# Patient Record
Sex: FEMALE | Race: WHITE | NOT HISPANIC OR LATINO | Employment: UNEMPLOYED | URBAN - METROPOLITAN AREA
[De-identification: names, ages, dates, MRNs, and addresses within clinical notes are randomized per-mention and may not be internally consistent; named-entity substitution may affect disease eponyms.]

---

## 2017-04-04 ENCOUNTER — TRANSFERRED RECORDS (OUTPATIENT)
Dept: HEALTH INFORMATION MANAGEMENT | Facility: CLINIC | Age: 32
End: 2017-04-04

## 2019-10-03 ENCOUNTER — OFFICE VISIT (OUTPATIENT)
Dept: OBGYN | Facility: CLINIC | Age: 34
End: 2019-10-03
Payer: COMMERCIAL

## 2019-10-03 VITALS
BODY MASS INDEX: 39.37 KG/M2 | HEIGHT: 63 IN | WEIGHT: 222.2 LBS | DIASTOLIC BLOOD PRESSURE: 68 MMHG | SYSTOLIC BLOOD PRESSURE: 106 MMHG

## 2019-10-03 DIAGNOSIS — F15.11 HISTORY OF METHAMPHETAMINE ABUSE (H): ICD-10-CM

## 2019-10-03 DIAGNOSIS — Z90.710 POST HYSTERECTOMY MENOPAUSE: Primary | ICD-10-CM

## 2019-10-03 DIAGNOSIS — E03.8 OTHER SPECIFIED HYPOTHYROIDISM: ICD-10-CM

## 2019-10-03 DIAGNOSIS — F15.91 HISTORY OF METHAMPHETAMINE USE: ICD-10-CM

## 2019-10-03 DIAGNOSIS — E89.40 POST HYSTERECTOMY MENOPAUSE: Primary | ICD-10-CM

## 2019-10-03 PROCEDURE — 99000 SPECIMEN HANDLING OFFICE-LAB: CPT | Performed by: OBSTETRICS & GYNECOLOGY

## 2019-10-03 PROCEDURE — 80307 DRUG TEST PRSMV CHEM ANLYZR: CPT | Mod: 90 | Performed by: OBSTETRICS & GYNECOLOGY

## 2019-10-03 PROCEDURE — 84443 ASSAY THYROID STIM HORMONE: CPT | Performed by: OBSTETRICS & GYNECOLOGY

## 2019-10-03 PROCEDURE — 84439 ASSAY OF FREE THYROXINE: CPT | Performed by: OBSTETRICS & GYNECOLOGY

## 2019-10-03 PROCEDURE — 36415 COLL VENOUS BLD VENIPUNCTURE: CPT | Performed by: OBSTETRICS & GYNECOLOGY

## 2019-10-03 PROCEDURE — 99202 OFFICE O/P NEW SF 15 MIN: CPT | Performed by: OBSTETRICS & GYNECOLOGY

## 2019-10-03 RX ORDER — QUETIAPINE FUMARATE 200 MG/1
200 TABLET, FILM COATED ORAL
COMMUNITY
Start: 2019-09-19

## 2019-10-03 RX ORDER — CLONIDINE HYDROCHLORIDE 0.1 MG/1
0.1 TABLET ORAL
COMMUNITY
Start: 2019-09-19 | End: 2019-11-22

## 2019-10-03 RX ORDER — OXCARBAZEPINE 150 MG/1
150 TABLET, FILM COATED ORAL
COMMUNITY
Start: 2019-09-19 | End: 2019-11-07

## 2019-10-03 RX ORDER — NICOTINE 21 MG/24HR
1 PATCH, TRANSDERMAL 24 HOURS TRANSDERMAL EVERY 24 HOURS
COMMUNITY
End: 2019-10-24

## 2019-10-03 RX ORDER — POLYETHYLENE GLYCOL 3350 17 G/17G
17 POWDER, FOR SOLUTION ORAL
COMMUNITY
Start: 2019-03-14 | End: 2019-10-03

## 2019-10-03 RX ORDER — ESTRADIOL 0.1 MG/D
1 PATCH TRANSDERMAL
COMMUNITY
Start: 2019-06-21 | End: 2019-10-03

## 2019-10-03 RX ORDER — LEVOTHYROXINE SODIUM 25 UG/1
25 TABLET ORAL
COMMUNITY
Start: 2019-09-19 | End: 2019-10-24

## 2019-10-03 RX ORDER — IBUPROFEN 800 MG/1
800 TABLET, FILM COATED ORAL
COMMUNITY
Start: 2018-11-02 | End: 2019-10-24

## 2019-10-03 RX ORDER — PRAZOSIN HYDROCHLORIDE 2 MG/1
4 CAPSULE ORAL
COMMUNITY
Start: 2019-09-19 | End: 2019-11-22

## 2019-10-03 RX ORDER — AMLODIPINE BESYLATE 5 MG/1
5 TABLET ORAL
COMMUNITY
Start: 2019-09-19 | End: 2019-11-22

## 2019-10-03 RX ORDER — ESTRADIOL 2 MG/1
2 TABLET ORAL DAILY
Qty: 30 TABLET | Refills: 1 | Status: SHIPPED | OUTPATIENT
Start: 2019-10-03 | End: 2020-02-11

## 2019-10-03 RX ORDER — BUSPIRONE HYDROCHLORIDE 15 MG/1
15 TABLET ORAL 3 TIMES DAILY
COMMUNITY
End: 2020-02-11

## 2019-10-03 ASSESSMENT — ANXIETY QUESTIONNAIRES
IF YOU CHECKED OFF ANY PROBLEMS ON THIS QUESTIONNAIRE, HOW DIFFICULT HAVE THESE PROBLEMS MADE IT FOR YOU TO DO YOUR WORK, TAKE CARE OF THINGS AT HOME, OR GET ALONG WITH OTHER PEOPLE: NOT DIFFICULT AT ALL
1. FEELING NERVOUS, ANXIOUS, OR ON EDGE: NEARLY EVERY DAY
5. BEING SO RESTLESS THAT IT IS HARD TO SIT STILL: MORE THAN HALF THE DAYS
3. WORRYING TOO MUCH ABOUT DIFFERENT THINGS: NEARLY EVERY DAY
GAD7 TOTAL SCORE: 20
6. BECOMING EASILY ANNOYED OR IRRITABLE: NEARLY EVERY DAY
7. FEELING AFRAID AS IF SOMETHING AWFUL MIGHT HAPPEN: NEARLY EVERY DAY
2. NOT BEING ABLE TO STOP OR CONTROL WORRYING: NEARLY EVERY DAY

## 2019-10-03 ASSESSMENT — PATIENT HEALTH QUESTIONNAIRE - PHQ9
5. POOR APPETITE OR OVEREATING: NEARLY EVERY DAY
SUM OF ALL RESPONSES TO PHQ QUESTIONS 1-9: 13

## 2019-10-03 ASSESSMENT — MIFFLIN-ST. JEOR: SCORE: 1682.02

## 2019-10-03 NOTE — PATIENT INSTRUCTIONS
Please have return in 2-3 weeks to evaluate the medication and to also do a blood pressure check.    Will check thyroid today prior to leaving.  Will also do a drug screen

## 2019-10-03 NOTE — PROGRESS NOTES
SUBJECTIVE:                                                   Autumn Campos is a 34 year old female who presents to clinic today for the following health issue(s):  Patient presents with:  Consult: Patient states that she is here for some type of hormone replacement medication.       HPI:    Patient is presenting for hormonal consult.  Patient had a total abdominal hysterectomy with bilateral salpingo-oophorectomy in May 2016 at [facility].  She was diagnosed with chronic PID she had a tubo-ovarian abscess and unfortunately they were unable to save the ovaries.  Patient's recovery was very complicated as she had a wound VAC and several wound debridements.  Patient has been followed closely by primary care physician for hypertension and is currently on blood pressure medication.     Patient is currently in treatment for methamphetamine use.  Patient states that she has 13 months of treatment.      Discussed hormonal therapy for her.  Discussed the different options.  Discussed the risk of blood clot high blood pressure.  Discussed we would want to follow up with her closely with her history of high blood pressure.  Discussed given her age we are comfortable moving forward with hormonal replacement.  Also counseled her regarding the fact that she does not have a uterus we will proceed with only estrogen therapy.  Her pathology from her surgery was in her care everywhere and that was reviewed she does not have a uterus, tubes, ovaries or cervix.    Of note patient also struggling to control her thyroid.  She has hypothyroidism last TSH was 11.6.  We will check her TSH with reflex T4 while she is in office today.        No LMP recorded. Patient has had a hysterectomy..     Patient is sexually active, No obstetric history on file..  Using hysterectomy for contraception.    reports that she has quit smoking. She smoked 0.00 packs per day. She has never used smokeless tobacco.    STD testing offered?   Declined    Health maintenance updated:  yes    Today's PHQ-2 Score:   PHQ-2 ( 1999 [employer]) 10/3/2019   Q1: Little interest or pleasure in doing things 2   Q2: Feeling down, depressed or hopeless 2   PHQ-2 Score 4     Today's PHQ-9 Score:   PHQ-9 SCORE 10/3/2019   PHQ-9 Total Score 13     Today's CHELA-7 Score:   CHELA-7 SCORE 10/3/2019   Total Score 20       Problem list and histories reviewed & adjusted, as indicated.  Additional history: as documented.    Patient Active Problem List   Diagnosis     History of methamphetamine use     Past Surgical History:   Procedure Laterality Date     Total abodominal hysterectomy Bilateral     removed uterus, cervix, tubes and ovaries      Social History     Tobacco Use     Smoking status: Former Smoker     Packs/day: 0.00     Smokeless tobacco: Never Used     Tobacco comment: Quit about a week ago.   Substance Use Topics     Alcohol use: Yes           Current Outpatient Medications   Medication Sig     amLODIPine (NORVASC) 5 MG tablet Take 5 mg by mouth     busPIRone (BUSPAR) 15 MG tablet Take 15 mg by mouth 3 times daily     cloNIDine (CATAPRES) 0.1 MG tablet Take 0.1 mg by mouth     estradiol (ESTRACE) 2 MG tablet Take 1 tablet (2 mg) by mouth daily     ibuprofen (ADVIL/MOTRIN) 800 MG tablet Take 800 mg by mouth     levothyroxine (SYNTHROID/LEVOTHROID) 25 MCG tablet Take 25 mcg by mouth     nicotine (NICODERM CQ) 14 MG/24HR 24 hr patch Place 1 patch onto the skin every 24 hours     OXcarbazepine (TRILEPTAL) 150 MG tablet Take 150 mg by mouth     prazosin (MINIPRESS) 2 MG capsule Take 4 mg by mouth     Prenatal Vit-Fe Fumarate-FA (PRENATAL PO)      QUEtiapine (SEROQUEL) 200 MG tablet Take 200 mg by mouth     ranitidine (ZANTAC) 150 MG tablet Take 150 mg by mouth     sertraline (ZOLOFT) 50 MG tablet Take 50 mg by mouth daily     No current facility-administered medications for this visit.      No Known Allergies    ROS:  12 point review of systems negative other than symptoms  "noted below.  Constitutional: Fatigue and Weight Gain  Genitourinary: Hot Flashes and Night Sweats    OBJECTIVE:     /68   Ht 1.6 m (5' 3\")   Wt 100.8 kg (222 lb 3.2 oz)   Breastfeeding? No   BMI 39.36 kg/m    Body mass index is 39.36 kg/m .    Exam:  Constitutional:  Appearance: Well nourished, well developed alert, in no acute distress  Psychiatric:  Mentation appears normal and affect normal/bright.   Chest: CTAB, RRR    In-Clinic Test Results:  No results found for this or any previous visit (from the past 24 hour(s)).    ASSESSMENT/PLAN:                                                        ICD-10-CM    1. Post hysterectomy menopause E89.40 estradiol (ESTRACE) 2 MG tablet    Z90.710    2. Other specified hypothyroidism E03.8 TSH with free T4 reflex     ENDOCRINOLOGY ADULT REFERRAL   3. History of methamphetamine abuse (H) F15.11  Drug Screen Comp Med Report WB   4. History of methamphetamine use Z87.898        Patient Instructions   Please have return in 2-3 weeks to evaluate the medication and to also do a blood pressure check.    Will check thyroid today prior to leaving.  Will also do a drug screen        1. Hormone replacement therapy-we will trial her on estradiol at this time.  Discussed risk benefits alternatives.  Also discussed side effect profile.  2. Thyroid asesment today, consider seeing endocrine in follow up  3. Return in 2-3 weeks, likely repeat thyroid labs at that time    Ariel Nath MD  [facility]  "

## 2019-10-04 ENCOUNTER — TELEPHONE (OUTPATIENT)
Dept: OBGYN | Facility: CLINIC | Age: 34
End: 2019-10-04

## 2019-10-04 DIAGNOSIS — E03.8 OTHER SPECIFIED HYPOTHYROIDISM: Primary | ICD-10-CM

## 2019-10-04 LAB
T4 FREE SERPL-MCNC: 0.85 NG/DL (ref 0.76–1.46)
TSH SERPL DL<=0.005 MIU/L-ACNC: 5.82 MU/L (ref 0.4–4)

## 2019-10-04 RX ORDER — LEVOTHYROXINE SODIUM 50 UG/1
50 TABLET ORAL DAILY
Qty: 90 TABLET | Refills: 0 | Status: CANCELLED | OUTPATIENT
Start: 2019-10-04

## 2019-10-04 ASSESSMENT — ANXIETY QUESTIONNAIRES: GAD7 TOTAL SCORE: 20

## 2019-10-15 LAB
AMPHETAMINES SPEC-MCNC: NEGATIVE NG/ML
APAP BLD-MCNC: NEGATIVE UG/ML
BARBITURATES SPEC-MCNC: NEGATIVE UG/ML
BENZODIAZ SPEC-MCNC: NEGATIVE NG/ML
BUPRENORPHINE SERPLBLD-MCNC: NEGATIVE NG/ML
CARBOXYTHC BLD-MCNC: NEGATIVE NG/ML
CARISOPRODOL IA: NEGATIVE UG/ML
COCAINE METABOLITE IA: NEGATIVE NG/ML
DECLARED MEDICATIONS: NORMAL
ETHANOL BLD-MCNC: NEGATIVE GM/DL
FENTANYL IA: NEGATIVE NG/ML
GABAPENTIN IA: NEGATIVE UG/ML
MEPERIDINE SERPLBLD-MCNC: NEGATIVE NG/ML
METHADONE BLD-MCNC: NEGATIVE NG/ML
OPIATES SPEC-MCNC: NEGATIVE NG/ML
OTHER DRUGS DETECTED: POSITIVE
OXYCODONE SERPLBLD SCN-MCNC: NEGATIVE NG/ML
PCP SPEC-MCNC: NEGATIVE NG/ML
PROPOXYPH SPEC-MCNC: NEGATIVE NG/ML
TRAMADOL BLD-MCNC: NEGATIVE NG/ML

## 2019-10-21 NOTE — PROGRESS NOTES
SUBJECTIVE:                                                   Autumn Campos is a 34 year old female who presents to clinic today for the following health issue(s):  Patient presents with:  Recheck Medication: f/u to estradiol-states things are going well. No new issues      HPI:  Patient overall doing well.  Patient states that on the estrogen her hot flashes are getting better.  No dizziness.  No sob, no nausea or emesis.    Treatment going ok.  She is changing counselors.  Patient states that she was stopped on her effexor and she is on sertraline at this time. She stopped the oxcarbazapine.            No LMP recorded. Patient has had a hysterectomy..     Patient is sexually active, .  Using none for contraception.    reports that she has quit smoking. She smoked 0.00 packs per day. She has never used smokeless tobacco.    STD testing offered?  Declined    Health maintenance updated:  yes    Problem list and histories reviewed & adjusted, as indicated.  Additional history: as documented.    Patient Active Problem List   Diagnosis     History of methamphetamine use     Past Surgical History:   Procedure Laterality Date     Total abodominal hysterectomy Bilateral     removed uterus, cervix, tubes and ovaries      Social History     Tobacco Use     Smoking status: Former Smoker     Packs/day: 0.00     Smokeless tobacco: Never Used     Tobacco comment: Quit about a week ago.   Substance Use Topics     Alcohol use: Yes      Problem (# of Occurrences) Relation (Name,Age of Onset)    No Known Problems (8) Mother, Father, Sister, Brother, Maternal Grandmother, Maternal Grandfather, Paternal Grandmother, Other            Current Outpatient Medications   Medication Sig     amLODIPine (NORVASC) 5 MG tablet Take 5 mg by mouth     busPIRone (BUSPAR) 15 MG tablet Take 15 mg by mouth 3 times daily     cloNIDine (CATAPRES) 0.1 MG tablet Take 0.1 mg by mouth     estradiol (ESTRACE) 2 MG tablet Take 1 tablet (2 mg) by  "mouth daily     levothyroxine (SYNTHROID/LEVOTHROID) 50 MCG tablet Take 50 mcg by mouth daily     OXcarbazepine (TRILEPTAL) 150 MG tablet Take 150 mg by mouth     prazosin (MINIPRESS) 2 MG capsule Take 4 mg by mouth     Prenatal Vit-Fe Fumarate-FA (PRENATAL PO)      QUEtiapine (SEROQUEL) 200 MG tablet Take 200 mg by mouth     ranitidine (ZANTAC) 150 MG tablet Take 150 mg by mouth     sertraline (ZOLOFT) 100 MG tablet Take 100 mg by mouth Takes 150 mg daily (1.5 tab)     No current facility-administered medications for this visit.      No Known Allergies    ROS:  12 point review of systems negative other than symptoms noted below.    OBJECTIVE:     /66   Pulse 70   Ht 1.6 m (5' 3\")   Wt 100.7 kg (222 lb)   BMI 39.33 kg/m    Body mass index is 39.33 kg/m .    Exam:  Constitutional:  Appearance: Well nourished, well developed alert, in no acute distress  Chest:  Respiratory Effort:  Breathing unlabored.   Cardiovascular: no edema  Neurologic:  Mental Status:  Oriented X3.    Psychiatric:  Mentation appears normal and affect normal/bright.     In-Clinic Test Results:  No results found for this or any previous visit (from the past 24 hour(s)).    ASSESSMENT/PLAN:                                                        ICD-10-CM    1. Other specified hypothyroidism E03.8 TSH with free T4 reflex   2. Surgical menopause E89.40        There are no Patient Instructions on file for this visit.    1. Menopause- will have her return in 2-3 weeks.  Had multiple medication changes.  Need to assess how she is doing  2. Thyroid- test today    20 minutes was spent face to face with the patient today discussing her history, diagnosis, and follow-up plan as noted above. Over 50% of the visit was spent in counseling and coordination of care.          Marielos George MD  [facility]  "

## 2019-10-24 ENCOUNTER — OFFICE VISIT (OUTPATIENT)
Dept: OBGYN | Facility: CLINIC | Age: 34
End: 2019-10-24
Payer: COMMERCIAL

## 2019-10-24 VITALS
BODY MASS INDEX: 39.34 KG/M2 | WEIGHT: 222 LBS | SYSTOLIC BLOOD PRESSURE: 114 MMHG | DIASTOLIC BLOOD PRESSURE: 66 MMHG | HEART RATE: 70 BPM | HEIGHT: 63 IN

## 2019-10-24 DIAGNOSIS — E03.8 OTHER SPECIFIED HYPOTHYROIDISM: Primary | ICD-10-CM

## 2019-10-24 DIAGNOSIS — E89.40 SURGICAL MENOPAUSE: ICD-10-CM

## 2019-10-24 PROCEDURE — 84443 ASSAY THYROID STIM HORMONE: CPT | Performed by: OBSTETRICS & GYNECOLOGY

## 2019-10-24 PROCEDURE — 84439 ASSAY OF FREE THYROXINE: CPT | Performed by: OBSTETRICS & GYNECOLOGY

## 2019-10-24 PROCEDURE — 99213 OFFICE O/P EST LOW 20 MIN: CPT | Performed by: OBSTETRICS & GYNECOLOGY

## 2019-10-24 PROCEDURE — 36415 COLL VENOUS BLD VENIPUNCTURE: CPT | Performed by: OBSTETRICS & GYNECOLOGY

## 2019-10-24 RX ORDER — SERTRALINE HYDROCHLORIDE 100 MG/1
100 TABLET, FILM COATED ORAL
COMMUNITY
End: 2019-11-22

## 2019-10-24 RX ORDER — LEVOTHYROXINE SODIUM 50 UG/1
50 TABLET ORAL DAILY
COMMUNITY
End: 2019-11-07

## 2019-10-24 ASSESSMENT — MIFFLIN-ST. JEOR: SCORE: 1681.12

## 2019-10-25 ENCOUNTER — TELEPHONE (OUTPATIENT)
Dept: OBGYN | Facility: CLINIC | Age: 34
End: 2019-10-25

## 2019-10-25 DIAGNOSIS — E03.8 OTHER SPECIFIED HYPOTHYROIDISM: Primary | ICD-10-CM

## 2019-10-25 LAB
T4 FREE SERPL-MCNC: 0.7 NG/DL (ref 0.76–1.46)
TSH SERPL DL<=0.005 MIU/L-ACNC: 7.72 MU/L (ref 0.4–4)

## 2019-10-25 RX ORDER — LEVOTHYROXINE SODIUM 88 UG/1
88 TABLET ORAL DAILY
Qty: 30 TABLET | Refills: 0 | Status: SHIPPED | OUTPATIENT
Start: 2019-10-25 | End: 2019-11-07

## 2019-10-25 NOTE — TELEPHONE ENCOUNTER
Result note from Dr. George  Notes recorded by Marielos Barrera MD on 10/25/2019 at 8:13 AM CDT  Her TSH is worse actually.  Is she taking the synthroid?  Can we increase to 88 mcg and repeat in 3 weeks    Patient informed.  Report that she is currently taking Synthroid 50 mcg. Patient agrees to take Synthroid 88 mcg and repeat lab in 3 weeks.     Request rx for 88 mcg send to [facility].  Sent as requested. Future lab placed 10/4/19.      Hilda Maza RN

## 2019-11-04 NOTE — PROGRESS NOTES
SUBJECTIVE:                                                   Autumn Campos is a 34 year old female who presents to clinic today for the following health issue(s):  Patient presents with:  Recheck Medication: no concerns, states still doing well on medications      HPI: Medication follow up  Patient with less hot flashes.  She is noticing more weight gain.  Patient with no headaches.  No nausea.  Patient states overall her menopausal symptoms have improved.  She has noticed weight gain.    No LMP recorded. Patient has had a hysterectomy..     Patient is not sexually active, .  Using hysterectomy for contraception.    reports that she has quit smoking. She smoked 0.00 packs per day. She has never used smokeless tobacco.    STD testing offered?  Declined    Health maintenance updated:  yes    Today's PHQ-2 Score:   PHQ-2 (  [employer]) 10/3/2019   Q1: Little interest or pleasure in doing things 2   Q2: Feeling down, depressed or hopeless 2   PHQ-2 Score 4     Today's PHQ-9 Score:   PHQ-9 SCORE 10/3/2019   PHQ-9 Total Score 13     Today's CHELA-7 Score:   CHELA-7 SCORE 10/3/2019   Total Score 20       Problem list and histories reviewed & adjusted, as indicated.  Additional history: as documented.    Patient Active Problem List   Diagnosis     History of methamphetamine use     Morbid obesity (H)     Past Surgical History:   Procedure Laterality Date     Total abodominal hysterectomy Bilateral     removed uterus, cervix, tubes and ovaries      Social History     Tobacco Use     Smoking status: Former Smoker     Packs/day: 0.00     Smokeless tobacco: Never Used     Tobacco comment: Quit about a week ago.   Substance Use Topics     Alcohol use: Yes      Problem (# of Occurrences) Relation (Name,Age of Onset)    No Known Problems (8) Mother, Father, Sister, Brother, Maternal Grandmother, Maternal Grandfather, Paternal Grandmother, Other            Current Outpatient Medications   Medication Sig     amLODIPine  "(NORVASC) 5 MG tablet Take 5 mg by mouth     busPIRone (BUSPAR) 15 MG tablet Take 15 mg by mouth 3 times daily     cloNIDine (CATAPRES) 0.1 MG tablet Take 0.1 mg by mouth     estradiol (ESTRACE) 2 MG tablet Take 1 tablet (2 mg) by mouth daily     levothyroxine (SYNTHROID/LEVOTHROID) 88 MCG tablet Take 1 tablet (88 mcg) by mouth daily     Prenatal Vit-Fe Fumarate-FA (PRENATAL PO)      Prenatal Vit-Fe Fumarate-FA (PRENATAL VITAMIN) 27-0.8 MG TABS Take 1 tablet by mouth daily     QUEtiapine (SEROQUEL) 200 MG tablet Take 200 mg by mouth     ranitidine (ZANTAC) 150 MG tablet Take 150 mg by mouth     sertraline (ZOLOFT) 100 MG tablet Take 100 mg by mouth Takes 150 mg daily (1.5 tab)     prazosin (MINIPRESS) 2 MG capsule Take 4 mg by mouth     No current facility-administered medications for this visit.      No Known Allergies    ROS:  12 point review of systems negative other than symptoms noted below.    OBJECTIVE:     /70   Ht 1.6 m (5' 3\")   Wt 109.2 kg (240 lb 12.8 oz)   BMI 42.66 kg/m    Body mass index is 42.66 kg/m .    Exam:  Constitutional:  Appearance: Well nourished, well developed alert, in no acute distress  Chest:  Respiratory Effort:  Breathing unlabored.   Cardiovascular: no edema  Skin: General Inspection:  No rashes present, no lesions present, no areas of discoloration.  Neurologic:  Mental Status:  Oriented X3.  Normal strength and tone, sensory exam grossly normal, mentation intact and speech normal.    Psychiatric:  Mentation appears normal and affect normal/bright.     In-Clinic Test Results:  No results found for this or any previous visit (from the past 24 hour(s)).    ASSESSMENT/PLAN:                                                        ICD-10-CM    1. Other specified hypothyroidism E03.8 TSH with free T4 reflex     ENDOCRINOLOGY ADULT REFERRAL     Prenatal Vit-Fe Fumarate-FA (PRENATAL VITAMIN) 27-0.8 MG TABS   2. Weight gain R63.5    3. Morbid obesity (H) E66.01    4. PTSD " (post-traumatic stress disorder) F43.10    5. Symptoms, such as flushing, sleeplessness, headache, lack of concentration, associated with the menopause N95.1        Patient Instructions   Endocrinology referral sent to watch thyroid  Labs done today  Return in 2-4 weeks  Continue with estrogen therapy      1. Continue with the estrogen therapy  2. Referral to endocrine for further thyroid management  3. She is inpatient program, they are managing her psych medication.  Sometimes these can also lead to weight gain.  Discussed thyroid and estrogen may be effecting her weight.    4. return to clinic 2-4 weeks    Marielos George MD  [facility]

## 2019-11-07 ENCOUNTER — OFFICE VISIT (OUTPATIENT)
Dept: OBGYN | Facility: CLINIC | Age: 34
End: 2019-11-07
Payer: COMMERCIAL

## 2019-11-07 ENCOUNTER — TELEPHONE (OUTPATIENT)
Dept: OBGYN | Facility: CLINIC | Age: 34
End: 2019-11-07

## 2019-11-07 VITALS
HEIGHT: 63 IN | DIASTOLIC BLOOD PRESSURE: 70 MMHG | BODY MASS INDEX: 42.66 KG/M2 | SYSTOLIC BLOOD PRESSURE: 110 MMHG | WEIGHT: 240.8 LBS

## 2019-11-07 DIAGNOSIS — F43.10 PTSD (POST-TRAUMATIC STRESS DISORDER): ICD-10-CM

## 2019-11-07 DIAGNOSIS — N95.1 SYMPTOMS, SUCH AS FLUSHING, SLEEPLESSNESS, HEADACHE, LACK OF CONCENTRATION, ASSOCIATED WITH THE MENOPAUSE: ICD-10-CM

## 2019-11-07 DIAGNOSIS — E03.8 OTHER SPECIFIED HYPOTHYROIDISM: Primary | ICD-10-CM

## 2019-11-07 DIAGNOSIS — R63.5 WEIGHT GAIN: ICD-10-CM

## 2019-11-07 DIAGNOSIS — E66.01 MORBID OBESITY (H): ICD-10-CM

## 2019-11-07 DIAGNOSIS — E03.8 OTHER SPECIFIED HYPOTHYROIDISM: ICD-10-CM

## 2019-11-07 LAB
T4 FREE SERPL-MCNC: 0.88 NG/DL (ref 0.76–1.46)
TSH SERPL DL<=0.005 MIU/L-ACNC: 6.28 MU/L (ref 0.4–4)

## 2019-11-07 PROCEDURE — 84439 ASSAY OF FREE THYROXINE: CPT | Performed by: OBSTETRICS & GYNECOLOGY

## 2019-11-07 PROCEDURE — 99213 OFFICE O/P EST LOW 20 MIN: CPT | Performed by: OBSTETRICS & GYNECOLOGY

## 2019-11-07 PROCEDURE — 84443 ASSAY THYROID STIM HORMONE: CPT | Performed by: OBSTETRICS & GYNECOLOGY

## 2019-11-07 PROCEDURE — 36415 COLL VENOUS BLD VENIPUNCTURE: CPT | Performed by: OBSTETRICS & GYNECOLOGY

## 2019-11-07 RX ORDER — LEVOTHYROXINE SODIUM 112 UG/1
112 TABLET ORAL DAILY
Qty: 30 TABLET | Refills: 0 | Status: SHIPPED | OUTPATIENT
Start: 2019-11-07 | End: 2019-11-22

## 2019-11-07 RX ORDER — PNV NO.95/FERROUS FUM/FOLIC AC 28MG-0.8MG
1 TABLET ORAL DAILY
Qty: 90 TABLET | Refills: 11 | Status: SHIPPED | OUTPATIENT
Start: 2019-11-07 | End: 2019-11-22

## 2019-11-07 ASSESSMENT — MIFFLIN-ST. JEOR: SCORE: 1766.39

## 2019-11-07 NOTE — PATIENT INSTRUCTIONS
Endocrinology referral sent to watch thyroid  Labs done today  Return in 2-4 weeks  Continue with estrogen therapy

## 2019-11-14 NOTE — PROGRESS NOTES
SUBJECTIVE:                                                   Autumn Campos is a 34 year old female who presents to clinic today for the following health issue(s):  Patient presents with:  Recheck Medication        HPI:  Patient states that her hot flashes are under control.  No new symptoms.  No isacc-menopausal symptoms.  Patient with continued weight gain.    No LMP recorded. Patient has had a hysterectomy..     Patient is not sexually active, .  Using not sexually active for contraception.    reports that she has quit smoking. She smoked 0.00 packs per day. She has never used smokeless tobacco.    STD testing offered?  Declined    Health maintenance updated:  yes    Today's PHQ-2 Score:   PHQ-2 (  [employer]) 2019   Q1: Little interest or pleasure in doing things 3   Q2: Feeling down, depressed or hopeless 3   PHQ-2 Score 6     Today's PHQ-9 Score:   PHQ-9 SCORE 2019   PHQ-9 Total Score 22   PHQ-A Total Score 21     Today's CHELA-7 Score:   CHELA-7 SCORE 2019   Total Score 21       Problem list and histories reviewed & adjusted, as indicated.  Additional history: as documented.    Patient Active Problem List   Diagnosis     History of methamphetamine use     Morbid obesity (H)     Past Surgical History:   Procedure Laterality Date     Total abodominal hysterectomy Bilateral     removed uterus, cervix, tubes and ovaries      Social History     Tobacco Use     Smoking status: Former Smoker     Packs/day: 0.00     Smokeless tobacco: Never Used     Tobacco comment: Quit about a week ago.   Substance Use Topics     Alcohol use: Not Currently      Problem (# of Occurrences) Relation (Name,Age of Onset)    No Known Problems (8) Mother, Father, Sister, Brother, Maternal Grandmother, Maternal Grandfather, Paternal Grandmother, Other            Current Outpatient Medications   Medication Sig     amLODIPine (NORVASC) 5 MG tablet Take 5 mg by mouth     busPIRone (BUSPAR) 15 MG tablet Take 15 mg by  "mouth 3 times daily     cloNIDine (CATAPRES) 0.1 MG tablet Take 0.1 mg by mouth     estradiol (ESTRACE) 1 MG tablet Take 1 tablet (1 mg) by mouth daily     estradiol (ESTRACE) 2 MG tablet Take 1 tablet (2 mg) by mouth daily     levothyroxine (SYNTHROID/LEVOTHROID) 112 MCG tablet Take 1 tablet (112 mcg) by mouth daily     Prenatal Vit-Fe Fumarate-FA (PRENATAL VITAMIN) 27-0.8 MG TABS Take 1 tablet by mouth daily     QUEtiapine (SEROQUEL) 200 MG tablet Take 200 mg by mouth     ranitidine (ZANTAC) 150 MG tablet Take 150 mg by mouth     sertraline (ZOLOFT) 100 MG tablet Take 100 mg by mouth Takes 150 mg daily (1.5 tab)     prazosin (MINIPRESS) 2 MG capsule Take 4 mg by mouth     No current facility-administered medications for this visit.      No Known Allergies    ROS:  Constitutional: Weight Gain  12 point review of systems negative other than symptoms noted below.    OBJECTIVE:     /70   Pulse 64   Ht 1.6 m (5' 3\")   Wt 110.7 kg (244 lb)   BMI 43.22 kg/m    Body mass index is 43.22 kg/m .    Exam:  Constitutional:  Appearance: Well nourished, well developed alert, in no acute distress  Chest:  Respiratory Effort:  Breathing unlabored.    Cardiovascular:no edema  Skin: General Inspection:  No rashes present, no lesions present, no areas of discoloration.  Neurologic:  Mental Status:  Oriented X3.  Normal strength and tone, sensory exam grossly normal, mentation intact and speech normal.    Psychiatric:  Mentation appears normal and affect normal/bright.     In-Clinic Test Results:  No results found for this or any previous visit (from the past 24 hour(s)).    ASSESSMENT/PLAN:                                                        ICD-10-CM    1. Hot flashes R23.2 estradiol (ESTRACE) 1 MG tablet   2. Weight gain R63.5 Prolactin     Comprehensive metabolic panel     CBC with platelets     TSH with free T4 reflex       There are no Patient Instructions on file for this visit.    1. Labs today  2. Decrease " estradiol to 1mg/day from the 2mg.  3. Discussed labs today- will re-check.  Discussed her weight gain and medication, thyroid.  Will change what we can at this time.  She is going to monitor her symptoms  4. Endocrine appt made      Marielos George MD  [facility] FOR [facility]

## 2019-11-19 ENCOUNTER — OFFICE VISIT (OUTPATIENT)
Dept: OBGYN | Facility: CLINIC | Age: 34
End: 2019-11-19
Payer: COMMERCIAL

## 2019-11-19 VITALS
HEIGHT: 63 IN | HEART RATE: 64 BPM | SYSTOLIC BLOOD PRESSURE: 112 MMHG | WEIGHT: 244 LBS | BODY MASS INDEX: 43.23 KG/M2 | DIASTOLIC BLOOD PRESSURE: 70 MMHG

## 2019-11-19 DIAGNOSIS — R23.2 HOT FLASHES: Primary | ICD-10-CM

## 2019-11-19 DIAGNOSIS — R63.5 WEIGHT GAIN: ICD-10-CM

## 2019-11-19 LAB
ERYTHROCYTE [DISTWIDTH] IN BLOOD BY AUTOMATED COUNT: 13.3 % (ref 10–15)
HCT VFR BLD AUTO: 36.9 % (ref 35–47)
HGB BLD-MCNC: 12.3 G/DL (ref 11.7–15.7)
MCH RBC QN AUTO: 28.3 PG (ref 26.5–33)
MCHC RBC AUTO-ENTMCNC: 33.3 G/DL (ref 31.5–36.5)
MCV RBC AUTO: 85 FL (ref 78–100)
PLATELET # BLD AUTO: 332 10E9/L (ref 150–450)
PROLACTIN SERPL-MCNC: 5 UG/L (ref 3–27)
RBC # BLD AUTO: 4.35 10E12/L (ref 3.8–5.2)
WBC # BLD AUTO: 7.9 10E9/L (ref 4–11)

## 2019-11-19 PROCEDURE — 84439 ASSAY OF FREE THYROXINE: CPT | Performed by: OBSTETRICS & GYNECOLOGY

## 2019-11-19 PROCEDURE — 85027 COMPLETE CBC AUTOMATED: CPT | Performed by: OBSTETRICS & GYNECOLOGY

## 2019-11-19 PROCEDURE — 80053 COMPREHEN METABOLIC PANEL: CPT | Performed by: OBSTETRICS & GYNECOLOGY

## 2019-11-19 PROCEDURE — 84146 ASSAY OF PROLACTIN: CPT | Performed by: OBSTETRICS & GYNECOLOGY

## 2019-11-19 PROCEDURE — 99213 OFFICE O/P EST LOW 20 MIN: CPT | Performed by: OBSTETRICS & GYNECOLOGY

## 2019-11-19 PROCEDURE — 84443 ASSAY THYROID STIM HORMONE: CPT | Performed by: OBSTETRICS & GYNECOLOGY

## 2019-11-19 PROCEDURE — 36415 COLL VENOUS BLD VENIPUNCTURE: CPT | Performed by: OBSTETRICS & GYNECOLOGY

## 2019-11-19 RX ORDER — ESTRADIOL 1 MG/1
1 TABLET ORAL DAILY
Qty: 90 TABLET | Refills: 3 | Status: SHIPPED | OUTPATIENT
Start: 2019-11-19 | End: 2019-11-22

## 2019-11-19 ASSESSMENT — PATIENT HEALTH QUESTIONNAIRE - PHQ9: 5. POOR APPETITE OR OVEREATING: NEARLY EVERY DAY

## 2019-11-19 ASSESSMENT — ANXIETY QUESTIONNAIRES
5. BEING SO RESTLESS THAT IT IS HARD TO SIT STILL: NEARLY EVERY DAY
2. NOT BEING ABLE TO STOP OR CONTROL WORRYING: NEARLY EVERY DAY
GAD7 TOTAL SCORE: 21
3. WORRYING TOO MUCH ABOUT DIFFERENT THINGS: NEARLY EVERY DAY
7. FEELING AFRAID AS IF SOMETHING AWFUL MIGHT HAPPEN: NEARLY EVERY DAY
6. BECOMING EASILY ANNOYED OR IRRITABLE: NEARLY EVERY DAY
1. FEELING NERVOUS, ANXIOUS, OR ON EDGE: NEARLY EVERY DAY
IF YOU CHECKED OFF ANY PROBLEMS ON THIS QUESTIONNAIRE, HOW DIFFICULT HAVE THESE PROBLEMS MADE IT FOR YOU TO DO YOUR WORK, TAKE CARE OF THINGS AT HOME, OR GET ALONG WITH OTHER PEOPLE: EXTREMELY DIFFICULT

## 2019-11-19 ASSESSMENT — MIFFLIN-ST. JEOR: SCORE: 1780.91

## 2019-11-20 ENCOUNTER — TELEPHONE (OUTPATIENT)
Dept: OBGYN | Facility: CLINIC | Age: 34
End: 2019-11-20

## 2019-11-20 LAB
ALBUMIN SERPL-MCNC: 4 G/DL (ref 3.4–5)
ALP SERPL-CCNC: 122 U/L (ref 40–150)
ALT SERPL W P-5'-P-CCNC: 23 U/L (ref 0–50)
ANION GAP SERPL CALCULATED.3IONS-SCNC: 8 MMOL/L (ref 3–14)
AST SERPL W P-5'-P-CCNC: 17 U/L (ref 0–45)
BILIRUB SERPL-MCNC: 0.3 MG/DL (ref 0.2–1.3)
BUN SERPL-MCNC: 17 MG/DL (ref 7–30)
CALCIUM SERPL-MCNC: 9.5 MG/DL (ref 8.5–10.1)
CHLORIDE SERPL-SCNC: 105 MMOL/L (ref 94–109)
CO2 SERPL-SCNC: 22 MMOL/L (ref 20–32)
CREAT SERPL-MCNC: 0.72 MG/DL (ref 0.52–1.04)
GFR SERPL CREATININE-BSD FRML MDRD: >90 ML/MIN/{1.73_M2}
GLUCOSE SERPL-MCNC: 78 MG/DL (ref 70–99)
POTASSIUM SERPL-SCNC: 4.3 MMOL/L (ref 3.4–5.3)
PROT SERPL-MCNC: 7.7 G/DL (ref 6.8–8.8)
SODIUM SERPL-SCNC: 135 MMOL/L (ref 133–144)
T4 FREE SERPL-MCNC: 0.93 NG/DL (ref 0.76–1.46)
TSH SERPL DL<=0.005 MIU/L-ACNC: 5.31 MU/L (ref 0.4–4)

## 2019-11-20 ASSESSMENT — ANXIETY QUESTIONNAIRES: GAD7 TOTAL SCORE: 21

## 2019-11-20 ASSESSMENT — PATIENT HEALTH QUESTIONNAIRE - PHQ9: SUM OF ALL RESPONSES TO PHQ QUESTIONS 1-9: 22

## 2019-11-22 ENCOUNTER — OFFICE VISIT (OUTPATIENT)
Dept: FAMILY MEDICINE | Facility: CLINIC | Age: 34
End: 2019-11-22
Payer: COMMERCIAL

## 2019-11-22 VITALS
HEIGHT: 63 IN | HEART RATE: 92 BPM | TEMPERATURE: 97.8 F | DIASTOLIC BLOOD PRESSURE: 81 MMHG | RESPIRATION RATE: 16 BRPM | SYSTOLIC BLOOD PRESSURE: 120 MMHG | WEIGHT: 241.2 LBS | BODY MASS INDEX: 42.74 KG/M2 | OXYGEN SATURATION: 97 %

## 2019-11-22 DIAGNOSIS — Z23 NEED FOR PROPHYLACTIC VACCINATION AND INOCULATION AGAINST INFLUENZA: ICD-10-CM

## 2019-11-22 DIAGNOSIS — E03.8 OTHER SPECIFIED HYPOTHYROIDISM: Primary | ICD-10-CM

## 2019-11-22 DIAGNOSIS — Z76.89 ENCOUNTER TO ESTABLISH CARE: Primary | ICD-10-CM

## 2019-11-22 DIAGNOSIS — I10 BENIGN ESSENTIAL HYPERTENSION: ICD-10-CM

## 2019-11-22 DIAGNOSIS — R23.2 HOT FLASHES: ICD-10-CM

## 2019-11-22 DIAGNOSIS — G89.29 CHRONIC PAIN OF RIGHT ANKLE: ICD-10-CM

## 2019-11-22 DIAGNOSIS — F31.81 BIPOLAR 2 DISORDER (H): ICD-10-CM

## 2019-11-22 DIAGNOSIS — E03.8 OTHER SPECIFIED HYPOTHYROIDISM: ICD-10-CM

## 2019-11-22 DIAGNOSIS — F15.91 HISTORY OF METHAMPHETAMINE USE: ICD-10-CM

## 2019-11-22 DIAGNOSIS — Z00.00 HEALTH CARE MAINTENANCE: ICD-10-CM

## 2019-11-22 DIAGNOSIS — Z72.0 TOBACCO ABUSE: ICD-10-CM

## 2019-11-22 DIAGNOSIS — M25.571 CHRONIC PAIN OF RIGHT ANKLE: ICD-10-CM

## 2019-11-22 PROBLEM — Z90.710 HISTORY OF TOTAL HYSTERECTOMY: Status: ACTIVE | Noted: 2019-11-22

## 2019-11-22 LAB
HAV IGG SER QL IA: NONREACTIVE
HAV IGM SERPL QL IA: NONREACTIVE
HBV SURFACE AB SERPL IA-ACNC: 0 M[IU]/ML
HBV SURFACE AG SERPL QL IA: NONREACTIVE
HCG UR QL: NEGATIVE
HCV AB SERPL QL IA: NONREACTIVE
HIV 1+2 AB+HIV1 P24 AG SERPL QL IA: NONREACTIVE

## 2019-11-22 RX ORDER — PNV NO.95/FERROUS FUM/FOLIC AC 28MG-0.8MG
1 TABLET ORAL DAILY
Qty: 90 TABLET | Refills: 11 | Status: SHIPPED | OUTPATIENT
Start: 2019-11-22 | End: 2021-03-23

## 2019-11-22 RX ORDER — PRAZOSIN HYDROCHLORIDE 2 MG/1
4 CAPSULE ORAL AT BEDTIME
Qty: 30 CAPSULE | Refills: 3 | Status: SHIPPED | OUTPATIENT
Start: 2019-11-22

## 2019-11-22 RX ORDER — OMEGA-3 FATTY ACIDS/FISH OIL 300-1000MG
600 CAPSULE ORAL EVERY 6 HOURS PRN
Qty: 90 CAPSULE | Refills: 3 | Status: SHIPPED | OUTPATIENT
Start: 2019-11-22

## 2019-11-22 RX ORDER — CLONIDINE HYDROCHLORIDE 0.1 MG/1
0.1 TABLET ORAL 4 TIMES DAILY PRN
Qty: 30 TABLET | Refills: 3 | Status: SHIPPED | OUTPATIENT
Start: 2019-11-22 | End: 2020-02-18 | Stop reason: DRUGHIGH

## 2019-11-22 RX ORDER — NICOTINE 21 MG/24HR
1 PATCH, TRANSDERMAL 24 HOURS TRANSDERMAL EVERY 24 HOURS
Qty: 28 PATCH | Refills: 3 | Status: SHIPPED | OUTPATIENT
Start: 2019-11-22 | End: 2020-01-10

## 2019-11-22 RX ORDER — AMLODIPINE BESYLATE 5 MG/1
5 TABLET ORAL DAILY
Qty: 30 TABLET | Refills: 3 | Status: SHIPPED | OUTPATIENT
Start: 2019-11-22 | End: 2020-02-28

## 2019-11-22 RX ORDER — SERTRALINE HYDROCHLORIDE 100 MG/1
100 TABLET, FILM COATED ORAL DAILY
Qty: 30 TABLET | Refills: 3 | Status: SHIPPED | OUTPATIENT
Start: 2019-11-22 | End: 2020-02-18 | Stop reason: DRUGHIGH

## 2019-11-22 RX ORDER — LEVOTHYROXINE SODIUM 137 UG/1
137 TABLET ORAL DAILY
Qty: 30 TABLET | Refills: 0 | Status: SHIPPED | OUTPATIENT
Start: 2019-11-22 | End: 2019-12-19

## 2019-11-22 RX ORDER — ESTRADIOL 1 MG/1
1 TABLET ORAL DAILY
Qty: 90 TABLET | Refills: 3 | Status: SHIPPED | OUTPATIENT
Start: 2019-11-22 | End: 2021-01-13

## 2019-11-22 RX ORDER — LEVOTHYROXINE SODIUM 112 UG/1
112 TABLET ORAL DAILY
Qty: 30 TABLET | Refills: 0 | Status: SHIPPED | OUTPATIENT
Start: 2019-11-22 | End: 2019-11-22

## 2019-11-22 ASSESSMENT — ENCOUNTER SYMPTOMS
CONSTIPATION: 0
HEMATURIA: 0
COUGH: 0
SHORTNESS OF BREATH: 0
VOMITING: 0
UNEXPECTED WEIGHT CHANGE: 0
BLOOD IN STOOL: 0
WEAKNESS: 0
NAUSEA: 0
ABDOMINAL PAIN: 0
ARTHRALGIAS: 0
MYALGIAS: 0
HEADACHES: 0
DYSURIA: 0
WHEEZING: 0
DIARRHEA: 0
FEVER: 0

## 2019-11-22 ASSESSMENT — MIFFLIN-ST. JEOR: SCORE: 1774.33

## 2019-11-22 NOTE — PROGRESS NOTES
HPI       Autumn Campos is a 34 year old  who presents for   Chief Complaint   Patient presents with     Physical     no concerns     Forms     [facility] Physical examination form and medication information form     [facility]ed in [facility] in Sept. Now moving into long term.     - Had a severe chronic PID infection > complications ending in total hysterectomy. On estrogen now.   2mg to 1mg for estrogen dose due to weight gain (change made 3 days ago)      -[facility] food is tough. High carb and fat.     -Weight gain :  30 lbs since Sept. Also gained weight since meth curbed appetite.   - February endocrine appt since thyroid had a hard time getting corrected;however, at goal now. .     - Last IV meth use Sept 24     -Seroquel takes for bipolar but also really helps for sleep- unable to stop taking it because unable to sleep off it. Started taking for 2015.  Remeron, trazodone tried for sleep in past and don't work.     Stopping smoking 1 month ago now on patch    Problem, Medication and Allergy Lists were      Patient Active Problem List    Diagnosis Date Noted     History of total hysterectomy 11/22/2019     Priority: Medium     Benign essential hypertension 11/22/2019     Priority: Medium     Morbid obesity (H) 11/07/2019     Priority: Medium     History of methamphetamine use 10/03/2019     Priority: Medium   ,     Current Outpatient Medications   Medication Sig Dispense Refill     amLODIPine (NORVASC) 5 MG tablet Take 5 mg by mouth       busPIRone (BUSPAR) 15 MG tablet Take 15 mg by mouth 3 times daily       cloNIDine (CATAPRES) 0.1 MG tablet Take 0.1 mg by mouth       estradiol (ESTRACE) 1 MG tablet Take 1 tablet (1 mg) by mouth daily 90 tablet 3     levothyroxine (SYNTHROID/LEVOTHROID) 112 MCG tablet Take 1 tablet (112 mcg) by mouth daily 30 tablet 0     Prenatal Vit-Fe Fumarate-FA (PRENATAL VITAMIN) 27-0.8 MG TABS Take 1 tablet by mouth daily 90 tablet 11      "QUEtiapine (SEROQUEL) 200 MG tablet Take 200 mg by mouth       ranitidine (ZANTAC) 150 MG tablet Take 150 mg by mouth       sertraline (ZOLOFT) 100 MG tablet Take 100 mg by mouth Takes 150 mg daily (1.5 tab)       estradiol (ESTRACE) 2 MG tablet Take 1 tablet (2 mg) by mouth daily 30 tablet 1     prazosin (MINIPRESS) 2 MG capsule Take 4 mg by mouth     ,   No Known Allergies.    Patient is an established patient of this clinic..         Review of Systems:   Review of Systems   Constitutional: Negative for fever and unexpected weight change.   Respiratory: Negative for cough, shortness of breath and wheezing.    Cardiovascular: Negative for chest pain.   Gastrointestinal: Negative for abdominal pain, blood in stool, constipation, diarrhea, nausea and vomiting.   Genitourinary: Negative for dysuria and hematuria.   Musculoskeletal: Negative for arthralgias and myalgias.   Neurological: Negative for weakness and headaches.             Physical Exam:     Vitals:    11/22/19 0816   BP: 120/81   Pulse: 92   Resp: 16   Temp: 97.8  F (36.6  C)   TempSrc: Oral   SpO2: 97%   Weight: 109.4 kg (241 lb 3.2 oz)   Height: 1.61 m (5' 3.39\")     Body mass index is 42.21 kg/m .  Vitals were reviewed and were normal     Physical Exam  Constitutional:       General: She is not in acute distress.     Appearance: She is well-developed.   HENT:      Head: Normocephalic and atraumatic.   Eyes:      Conjunctiva/sclera: Conjunctivae normal.   Cardiovascular:      Rate and Rhythm: Normal rate and regular rhythm.      Heart sounds: Normal heart sounds.   Pulmonary:      Effort: Pulmonary effort is normal. No respiratory distress.   Abdominal:      General: Bowel sounds are normal. There is no distension.      Palpations: Abdomen is soft.      Tenderness: There is no abdominal tenderness.   Musculoskeletal: Normal range of motion.   Skin:     General: Skin is warm.      Capillary Refill: Capillary refill takes less than 2 seconds.      " Findings: No rash.   Neurological:      Mental Status: She is alert and oriented to person, place, and time.       Left Ankle Exam     Tenderness   The patient is experiencing no tenderness.   Swelling: none    Range of Motion   The patient has normal left ankle ROM.     Muscle Strength   Dorsiflexion:  5/5   Plantar flexion:  5/5   Anterior tibial:  5/5   Posterior tibial:  5/5  Gastrocsoleus:  5/5  Peroneal muscle:  5/5    Tests   Anterior drawer: negative  Varus tilt: negative    Comments:  Mild tenderness with maximum inversion             Results:   Ordered and pending    Assessment and Plan        1. Encounter to establish care  [facility] labs obtained, medication list list updated    2. History of methamphetamine use  [facility] screening labs cover IV drug use screening  - HIV Antigen Antibody Combo  - Hepatitis A Antibody IgG  - Hepatitis A antibody IgM  - Hepatitis B Surface Antibody  - Hepatitis B surface antigen  - Hepatitis C antibody  - Quantiferon TB Gold Plus  - HCG Qualitative Urine ([facility])    3. Other specified hypothyroidism  Most recent T4 at goal, refill to the increase of Synthroid 112 mg.  Patient may not feel increase for 4 to 8 weeks, follow-up in 1 month  - Prenatal Vit-Fe Fumarate-FA (PRENATAL VITAMIN) 27-0.8 MG TABS; Take 1 tablet by mouth daily  Dispense: 90 tablet; Refill: 11  - levothyroxine (SYNTHROID/LEVOTHROID) 112 MCG tablet; Take 1 tablet (112 mcg) by mouth daily  Dispense: 30 tablet; Refill: 0    4. Hot flashes  Recently had estradiol decreased to 1 mg, is not currently having any hot flashes or vaginal dryness.  Follow-up in 1 month  - estradiol (ESTRACE) 1 MG tablet; Take 1 tablet (1 mg) by mouth daily  Dispense: 90 tablet; Refill: 3    5. Chronic pain of right ankle  Patient with very tight ligaments after history responding to them, worsened by recent.  Patient would benefit from doing ankle strengthening exercises and receiving her own parents and think she can use  continuously at [facility]  - [facility] PT, HAND, AND CHIROPRACTIC REFERRAL; Future  - ibuprofen (ADVIL/MOTRIN) 200 MG capsule; Take 3 capsules (600 mg) by mouth every 6 hours as needed for fever  Dispense: 90 capsule; Refill: 3    6. Bipolar 2 disorder (H)  Patient's mental health stable on Zoloft, has been at the dosage for about 6 months now has not had any manic episodes.  Question bipolar diagnosis given that all of her manic episodes seem to be during methamphetamine use.  Follow-up in 1 month  - sertraline (ZOLOFT) 100 MG tablet; Take 1 tablet (100 mg) by mouth daily Takes 150 mg daily (1.5 tab)  Dispense: 30 tablet; Refill: 3  - prazosin (MINIPRESS) 2 MG capsule; Take 2 capsules (4 mg) by mouth At Bedtime  Dispense: 30 capsule; Refill: 3    7. Benign essential hypertension  Blood pressure at goal, asymptomatic   - amLODIPine (NORVASC) 5 MG tablet; Take 1 tablet (5 mg) by mouth daily  Dispense: 30 tablet; Refill: 3  - cloNIDine (CATAPRES) 0.1 MG tablet; Take 1 tablet (0.1 mg) by mouth 4 times daily as needed (anxiety)  Dispense: 30 tablet; Refill: 3    8. Tobacco abuse  encourage progress on stopping tobacco, nicotine patches given.  - nicotine (NICODERM CQ) 14 MG/24HR 24 hr patch; Place 1 patch onto the skin every 24 hours  Dispense: 28 patch; Refill: 3      I will repeat your blood work on thyroid in January for endocrinology      There are no discontinued medications.    Options for treatment and follow-up care were reviewed with the patient. Autumn Campos  engaged in the decision making process and verbalized understanding of the options discussed and agreed with the final plan.    Ellis Reynoso MD

## 2019-11-22 NOTE — PROGRESS NOTES
Preceptor Attestation:   Patient seen and discussed with the resident. Assessment and plan reviewed with resident and agreed upon.   Supervising Physician:  DO [facility]

## 2019-11-22 NOTE — PATIENT INSTRUCTIONS
1. Will message [facility] to get you into rehab. Come see me again in January. In the mean time will ask our psychiatrist about sleep aids that work at [facility] and possible to wean seroquel. When I get an answer I will schedule you an appointment as well.     Thank you for coming to [facility].    **If you had lab testing today and your results are reassuring or normal they will be be mailed to you or sent to your MyChart and you will be notified by email within 7 days.   **If the lab tests need quick action we will call you with the results.  The phone number we will call with results is # [phone] (home)    (home) . If this is not the best number please call our clinic and change the number.  **If any referrals were ordered today you should be getting a call in the next week.  If you don't hear about this within a week please call one of the following numbers   If your referral is for [facility] please call [phone]  If your referral is to outside [facility] please call the clinic number ([phone]) and ask for your team care coordinator.  If you need any refills please call your pharmacy and they will contact us.  If you have any concerns about today's visit or wish to schedule another appointment  please call our office during normal business hours  [phone] (8-5:00 M-F)  If you have urgent medical concerns please call [phone] at any time of the day.  If you have a medical emergency please call [phone]    Again thank you for choosing [facility] and please let us know how we can best partner with you to improve your and your family's health.

## 2019-11-24 LAB
GAMMA INTERFERON BACKGROUND BLD IA-ACNC: 0.03 IU/ML
M TB IFN-G BLD-IMP: NEGATIVE
M TB IFN-G CD4+ BCKGRND COR BLD-ACNC: 6.83 IU/ML
MITOGEN IGNF BCKGRD COR BLD-ACNC: 0.02 IU/ML
MITOGEN IGNF BCKGRD COR BLD-ACNC: 0.02 IU/ML

## 2019-12-02 NOTE — PROGRESS NOTES
SUBJECTIVE:                                                   Autumn Campos is a 34 year old female who presents to clinic today for the following health issue(s):  Patient presents with:  Thyroid Problem: Here for Thyroid follow up, endo appt in February          HPI:  Patient presenting for follow-up of medication.  Patient was started on estradiol 1 mg as opposed to 2 mg last time she was in.  Patient has had stable weight gain since November.  Patient has had stable mood.  Patient has had no increase in hot flashes or night sweats.    No LMP recorded. Patient has had a hysterectomy..     Patient is not sexually active, .  Using hysterectomy for contraception.    reports that she quit smoking about 3 months ago. She smoked 0.00 packs per day. She has never used smokeless tobacco.    STD testing offered?  Declined - not sexually active    Health maintenance updated:  yes    Today's PHQ-2 Score:   PHQ-2 (  [employer]) 2019   Q1: Little interest or pleasure in doing things 3   Q2: Feeling down, depressed or hopeless 3   PHQ-2 Score 6     Today's PHQ-9 Score:   PHQ-9 SCORE 2019   PHQ-9 Total Score 22   PHQ-A Total Score 21     Today's CHELA-7 Score:   CHELA-7 SCORE 2019   Total Score 21       Problem list and histories reviewed & adjusted, as indicated.  Additional history: as documented.    Patient Active Problem List   Diagnosis     History of methamphetamine use     Morbid obesity (H)     History of total hysterectomy     Benign essential hypertension     Past Surgical History:   Procedure Laterality Date     Total abodominal hysterectomy Bilateral     removed uterus, cervix, tubes and ovaries      Social History     Tobacco Use     Smoking status: Former Smoker     Packs/day: 0.00     Last attempt to quit: 2019     Years since quittin.2     Smokeless tobacco: Never Used     Tobacco comment: Quit   Substance Use Topics     Alcohol use: Not Currently      Problem (# of  "Occurrences) Relation (Name,Age of Onset)    Diabetes (1) Father    No Known Problems (7) Mother, Sister, Brother, Maternal Grandmother, Maternal Grandfather, Paternal Grandmother, Other            Current Outpatient Medications   Medication Sig     amLODIPine (NORVASC) 5 MG tablet Take 1 tablet (5 mg) by mouth daily     busPIRone (BUSPAR) 15 MG tablet Take 15 mg by mouth 3 times daily     cloNIDine (CATAPRES) 0.1 MG tablet Take 1 tablet (0.1 mg) by mouth 4 times daily as needed (anxiety)     estradiol (ESTRACE) 1 MG tablet Take 1 tablet (1 mg) by mouth daily     estradiol (ESTRACE) 2 MG tablet Take 1 tablet (2 mg) by mouth daily     ibuprofen (ADVIL/MOTRIN) 200 MG capsule Take 3 capsules (600 mg) by mouth every 6 hours as needed for fever     levothyroxine (SYNTHROID/LEVOTHROID) 137 MCG tablet Take 1 tablet (137 mcg) by mouth daily     nicotine (NICODERM CQ) 14 MG/24HR 24 hr patch Place 1 patch onto the skin every 24 hours     prazosin (MINIPRESS) 2 MG capsule Take 2 capsules (4 mg) by mouth At Bedtime     Prenatal Vit-Fe Fumarate-FA (PRENATAL VITAMIN) 27-0.8 MG TABS Take 1 tablet by mouth daily     QUEtiapine (SEROQUEL) 200 MG tablet Take 200 mg by mouth     ranitidine (ZANTAC) 150 MG tablet Take 150 mg by mouth as needed Take as needed for reflux      sertraline (ZOLOFT) 100 MG tablet Take 1 tablet (100 mg) by mouth daily Takes 150 mg daily (1.5 tab)     No current facility-administered medications for this visit.      No Known Allergies    ROS:  12 point review of systems negative other than symptoms noted below or in the HPI.  Constitutional: Weight Gain  No urinary frequency or dysuria, bladder or kidney problems      OBJECTIVE:     /70   Pulse 80   Ht 1.607 m (5' 3.25\")   Wt 110.7 kg (244 lb)   Breastfeeding No   BMI 42.88 kg/m    Body mass index is 42.88 kg/m .    Exam:  Constitutional:  Appearance: Well nourished, well developed alert, in no acute distress  Chest:  Respiratory Effort:  Breathing " unlabored.    Cardiovascular: Heart: no edema  Skin: General Inspection:  No rashes present, no lesions present, no areas of discoloration.  Neurologic:  Mental Status:  Oriented X3.  Normal strength and tone, sensory exam grossly normal, mentation intact and speech normal.    Psychiatric:  Mentation appears normal and affect normal/bright.     In-Clinic Test Results:  No results found for this or any previous visit (from the past 24 hour(s)).    ASSESSMENT/PLAN:                                                        ICD-10-CM    1. Weight gain R63.5 Cortisol     Thyroid peroxidase antibody   2. Other specified hypothyroidism E03.8 TSH with free T4 reflex   3. Symptoms, such as flushing, sleeplessness, headache, lack of concentration, associated with the menopause N95.1        There are no Patient Instructions on file for this visit.    1. Menopause symptoms- continue estradiol at this time  2. Hypothyroid- labs today, has endocrine in February.  Weight gain stable at this time.    Marielos George MD  [facility] [place]

## 2019-12-03 ENCOUNTER — OFFICE VISIT (OUTPATIENT)
Dept: OBGYN | Facility: CLINIC | Age: 34
End: 2019-12-03
Payer: COMMERCIAL

## 2019-12-03 VITALS
HEART RATE: 80 BPM | DIASTOLIC BLOOD PRESSURE: 70 MMHG | WEIGHT: 244 LBS | BODY MASS INDEX: 43.23 KG/M2 | HEIGHT: 63 IN | SYSTOLIC BLOOD PRESSURE: 112 MMHG

## 2019-12-03 DIAGNOSIS — R63.5 WEIGHT GAIN: Primary | ICD-10-CM

## 2019-12-03 DIAGNOSIS — E03.8 OTHER SPECIFIED HYPOTHYROIDISM: ICD-10-CM

## 2019-12-03 DIAGNOSIS — N95.1 SYMPTOMS, SUCH AS FLUSHING, SLEEPLESSNESS, HEADACHE, LACK OF CONCENTRATION, ASSOCIATED WITH THE MENOPAUSE: ICD-10-CM

## 2019-12-03 LAB
CORTIS SERPL-MCNC: 4 UG/DL (ref 4–22)
TSH SERPL DL<=0.005 MIU/L-ACNC: 2.56 MU/L (ref 0.4–4)

## 2019-12-03 PROCEDURE — 82533 TOTAL CORTISOL: CPT | Performed by: OBSTETRICS & GYNECOLOGY

## 2019-12-03 PROCEDURE — 86376 MICROSOMAL ANTIBODY EACH: CPT | Performed by: OBSTETRICS & GYNECOLOGY

## 2019-12-03 PROCEDURE — 36415 COLL VENOUS BLD VENIPUNCTURE: CPT | Performed by: OBSTETRICS & GYNECOLOGY

## 2019-12-03 PROCEDURE — 99213 OFFICE O/P EST LOW 20 MIN: CPT | Performed by: OBSTETRICS & GYNECOLOGY

## 2019-12-03 PROCEDURE — 84443 ASSAY THYROID STIM HORMONE: CPT | Performed by: OBSTETRICS & GYNECOLOGY

## 2019-12-03 ASSESSMENT — MIFFLIN-ST. JEOR: SCORE: 1784.87

## 2019-12-04 LAB — THYROPEROXIDASE AB SERPL-ACNC: 3868 IU/ML

## 2019-12-18 ENCOUNTER — THERAPY VISIT (OUTPATIENT)
Dept: PHYSICAL THERAPY | Facility: CLINIC | Age: 34
End: 2019-12-18
Attending: FAMILY MEDICINE
Payer: COMMERCIAL

## 2019-12-18 DIAGNOSIS — G89.29 CHRONIC PAIN OF RIGHT ANKLE: ICD-10-CM

## 2019-12-18 DIAGNOSIS — M25.571 CHRONIC PAIN OF RIGHT ANKLE: ICD-10-CM

## 2019-12-18 DIAGNOSIS — M25.572 PAIN IN JOINT INVOLVING ANKLE AND FOOT, LEFT: Primary | ICD-10-CM

## 2019-12-18 PROCEDURE — 97140 MANUAL THERAPY 1/> REGIONS: CPT | Mod: GP | Performed by: PHYSICAL THERAPIST

## 2019-12-18 PROCEDURE — 97110 THERAPEUTIC EXERCISES: CPT | Mod: GP | Performed by: PHYSICAL THERAPIST

## 2019-12-18 PROCEDURE — 97161 PT EVAL LOW COMPLEX 20 MIN: CPT | Mod: GP | Performed by: PHYSICAL THERAPIST

## 2019-12-18 NOTE — PROGRESS NOTES
"[facility] Initial Evaluation -- Lower Extremity    Evaluation Date: December 18, 2019  Autumn Campos is a 34 year old female with a L ankle condition.   Referral: GP  Work mechanical stresses: [employer] [facility]   Employment status:   Leisure mechanical stresses:   Functional disability score:   VAS score (0-10): 5  Patient goals/expectations:  Descend stairs pain free    HISTORY:    Present symptoms: R post calcaneus and lat ankle pain   Pain quality (sharp/shooting/stabbing/aching/burning/cramping):  sharp    Present since (onset date): August 2019 she sprained it and she has s/s it 10-15 times, one of which was a Fx as a kid    Symptoms (improving/unchaning/worsening):  improving    Symptoms commenced as a result of:  She missed a step.  \"it was one of the worst sprains I've had\"   Condition occurred in the following environment:      Symptoms at onset:   Paresthesia (yes/no):  no  Spinal history: none   Cough/Sneeze (pos/neg):  no    Constant symptoms:   Intermittent symptoms:yes    Symptoms are worse with the following: Always descending Stairs and Other - running, always standing always  Time of day- the same   Symptoms are better with the following: Other - rest    Continued use makes the pain (better/worse/no effect): same    Disturbed night (yes/no): no      Pain at rest (yes/no):    Site (back/hip/knee/ankle/foot):  ankle    Other questions (swelling/clicking/locking/giving way/falling):  none     Previous episodes:   Previous treatments: none    Specific Questions:  General health (excellent/good/fair/poor):  fair  Pertinent medical history includes: Chemical dependency, Depression, High blood pressure, Menopausal, Mental illness and Overweight  Medications (nil/NSAIDS/analg/steroids/anticoag/other):  Other - Thyroid, Sleep, Anti-depressants and High blood pressure  Medical allergies:  none  Imaging (none/Xray/MRI/other):  none  Recent or major surgery (yes/no):  no  Night " pain (yes/no):  no  Accidents (yes/no):  no  Unexplained weight loss (yes/no):  no  Barriers at home: none  Other red flags: none    Sites for physical examination (back/hip/knee/ankle/foot/other): ankle    EXAMINATION    Posture:  Sitting (good/fair/poor): poor    Correction of Posture (better/worse/no effect/NA): na  Standing (good/fair/poor):   Other observations:      Neurological: (NA/motor/sensory/reflexes/dural): none    Baselines (pain or functional activity): 6 inch step up 5/10  Crossing legs at ankle 4/10      Extremities (Hip / Knee / Ankle / Foot): ankle    Movement Loss Julio C Mod Min Nil Pain   Flexion        Extension        Abduction        Adduction        Internal Rotation        External Rotation        Dorsiflexion G 8-0/12 S 11-0/0    blocked   Plantarflexion  +   Ant ankle stiffness   Inversion    +    Eversion  +        Passive Movement (+/- over pressure)/(PDM/ERP):  Hard end feel DF inv with lateral ankle pain eversion with medial ankle pain  Resisted Test Response (pain): MMT: all 5/5   Other Tests: ant drawer gr 2 gait: wnl    Spine:  Movement loss:   Effect of repeated movements:   Effect of static positioning:   Spine testing (not relevant/relevant/secondary problem):     Baseline Symptoms:   Repeated Tests Symptom Response Mechanical Response   Active/Passive movement, resisted test, functional test During -  Produce, Abolish, Increase, Decrease, NE After -  Better, Worse, NB, NW, NE Effect -   ? or ? ROM, strength or key functional test No   Effect   Stand rep ankle DF(gastroc) 15 x 3 sec No Effect    No effect    Inc PF eversion DF                               Effect of static positioning                  Provisional Classification (Extremity/Spine):  Extremity - Inconclusive/Other - needs further testing      Princicple of Management:   Education:  HEP avoid sustained PF etiology    Equipment provided:    Exercise and dosage:   Stand rep ankle DF 15 x 3 sec 4 x  day    ASSESSMENT/PLAN:    Patient is a 34 year old female with left side ankle complaints.    Patient has the following significant findings with corresponding treatment plan.                Diagnosis 1:  Ankle pain  Pain -  manual therapy, self management, education, directional preference exercise and home program  Decreased ROM/flexibility - manual therapy, therapeutic exercise, therapeutic activity and home program  Decreased joint mobility - manual therapy, therapeutic exercise, therapeutic activity and home program  Decreased proprioception - neuro re-education, therapeutic activities and home program  Edema - self management/home program  Decreased function - therapeutic activities and home program      Previous and current functional limitations:  (See Goal Flow Sheet for this information)    Short term and Long term goals: (See Goal Flow Sheet for this information)     Communication ability:  Patient appears to be able to clearly communicate and understand verbal and written communication and follow directions correctly.  Treatment Explanation - The following has been discussed with the patient:   RX ordered/plan of care  Anticipated outcomes  Possible risks and side effects  This patient would benefit from PT intervention to resume normal activities.   Rehab potential is good.    Frequency:  1 X week, once daily  Duration:  for 6 weeks  Discharge Plan:  Achieve all LTG.  Independent in home treatment program.  Reach maximal therapeutic benefit.    Please refer to the daily flowsheet for treatment today, total treatment time and time spent performing 1:1 timed codes.

## 2019-12-19 DIAGNOSIS — E03.8 OTHER SPECIFIED HYPOTHYROIDISM: ICD-10-CM

## 2019-12-19 PROBLEM — M25.572 PAIN IN JOINT INVOLVING ANKLE AND FOOT, LEFT: Status: ACTIVE | Noted: 2019-12-19

## 2019-12-19 RX ORDER — LEVOTHYROXINE SODIUM 137 UG/1
137 TABLET ORAL DAILY
Qty: 30 TABLET | Refills: 0 | Status: SHIPPED | OUTPATIENT
Start: 2019-12-19 | End: 2020-01-24

## 2019-12-19 NOTE — TELEPHONE ENCOUNTER
"Requested Prescriptions   Pending Prescriptions Disp Refills     levothyroxine (SYNTHROID/LEVOTHROID) 137 MCG tablet 30 tablet 0     Sig: Take 1 tablet (137 mcg) by mouth daily       Thyroid Protocol Passed - 12/19/2019  9:03 AM        Passed - Patient is 12 years or older        Passed - Recent (12 mo) or future (30 days) visit within the authorizing provider's specialty     Patient has had an office visit with the authorizing provider or a provider within the authorizing providers department within the previous 12 mos or has a future within next 30 days. See \"Patient Info\" tab in inbasket, or \"Choose Columns\" in Meds & Orders section of the refill encounter.              Passed - Medication is active on med list        Passed - Normal TSH on file in past 12 months     Recent Labs   Lab Test 12/03/19  0837   TSH 2.56              Passed - No active pregnancy on record     If patient is pregnant or has had a positive pregnancy test, please check TSH.          Passed - No positive pregnancy test in past 12 months     If patient is pregnant or has had a positive pregnancy test, please check TSH.          Last Written Prescription Date:  11/22/19  Last Fill Quantity: 30,  # refills: 0   Last office visit: 12/3/2019 with prescribing provider:  Dr George   Future Office Visit:   Next 5 appointments (look out 90 days)    Dec 26, 2019  8:40 AM CST  SHORT with Marielos George MD  [facility] [place] ([facility]) [address] [address]  [phone]           "

## 2019-12-19 NOTE — TELEPHONE ENCOUNTER
Prescription approved per [facility] Refill Protocol.  Suzie Alvarez RN on 12/19/2019 at 9:07 AM

## 2020-01-10 ENCOUNTER — OFFICE VISIT (OUTPATIENT)
Dept: FAMILY MEDICINE | Facility: CLINIC | Age: 35
End: 2020-01-10
Payer: COMMERCIAL

## 2020-01-10 VITALS
TEMPERATURE: 97.6 F | SYSTOLIC BLOOD PRESSURE: 107 MMHG | WEIGHT: 247 LBS | OXYGEN SATURATION: 98 % | HEART RATE: 91 BPM | RESPIRATION RATE: 16 BRPM | HEIGHT: 63 IN | DIASTOLIC BLOOD PRESSURE: 78 MMHG | BODY MASS INDEX: 43.77 KG/M2

## 2020-01-10 DIAGNOSIS — K21.00 GASTROESOPHAGEAL REFLUX DISEASE WITH ESOPHAGITIS: ICD-10-CM

## 2020-01-10 DIAGNOSIS — R63.5 ABNORMAL WEIGHT GAIN: Primary | ICD-10-CM

## 2020-01-10 LAB — HBA1C MFR BLD: 4.8 % (ref 4.1–5.7)

## 2020-01-10 RX ORDER — FAMOTIDINE 10 MG
10 TABLET ORAL 2 TIMES DAILY PRN
Qty: 30 TABLET | Refills: 3 | Status: SHIPPED | OUTPATIENT
Start: 2020-01-10

## 2020-01-10 ASSESSMENT — PATIENT HEALTH QUESTIONNAIRE - PHQ9
SUM OF ALL RESPONSES TO PHQ QUESTIONS 1-9: 21
5. POOR APPETITE OR OVEREATING: NEARLY EVERY DAY

## 2020-01-10 ASSESSMENT — ANXIETY QUESTIONNAIRES
GAD7 TOTAL SCORE: 21
IF YOU CHECKED OFF ANY PROBLEMS ON THIS QUESTIONNAIRE, HOW DIFFICULT HAVE THESE PROBLEMS MADE IT FOR YOU TO DO YOUR WORK, TAKE CARE OF THINGS AT HOME, OR GET ALONG WITH OTHER PEOPLE: EXTREMELY DIFFICULT
5. BEING SO RESTLESS THAT IT IS HARD TO SIT STILL: NEARLY EVERY DAY
1. FEELING NERVOUS, ANXIOUS, OR ON EDGE: NEARLY EVERY DAY
7. FEELING AFRAID AS IF SOMETHING AWFUL MIGHT HAPPEN: NEARLY EVERY DAY
6. BECOMING EASILY ANNOYED OR IRRITABLE: NEARLY EVERY DAY
2. NOT BEING ABLE TO STOP OR CONTROL WORRYING: NEARLY EVERY DAY
3. WORRYING TOO MUCH ABOUT DIFFERENT THINGS: NEARLY EVERY DAY

## 2020-01-10 ASSESSMENT — MIFFLIN-ST. JEOR: SCORE: 1800.63

## 2020-01-10 NOTE — PROGRESS NOTES
Preceptor Attestation:   Patient seen, evaluated and discussed with the resident. I have verified the content of the note, which accurately reflects my assessment of the patient and the plan of care.   Supervising Physician:  Leslie Pichardo MD.

## 2020-01-10 NOTE — PROGRESS NOTES
HPI       Autumn Campos is a 34 year old  who presents for   Chief Complaint   Patient presents with     RECHECK     Follow up on thyroid labs and weight gain     Weight Gain/ Thyroid and Lab Follow up   3 lbs higher than in November. Lower carb, doesn't drink and sodas. Very frustrated. 30 minutes 4 times a week. Feels like that is about as much as she could do currently w her fatigue.   - sleep staying asleep is an issue but going to sleep is okay with the seroquel.   - has been on seroquel for years   - TSH normal in Dec at OBGYN check; but TPO abs very high. Cortisol also normal. Feb 2nd appt w Endocrinology       Problem, Medication and Allergy Lists were      Patient Active Problem List    Diagnosis Date Noted     Pain in joint involving ankle and foot, left 12/19/2019     Priority: Medium     History of total hysterectomy 11/22/2019     Priority: Medium     Benign essential hypertension 11/22/2019     Priority: Medium     Morbid obesity (H) 11/07/2019     Priority: Medium     History of methamphetamine use 10/03/2019     Priority: Medium   ,     Current Outpatient Medications   Medication Sig Dispense Refill     amLODIPine (NORVASC) 5 MG tablet Take 1 tablet (5 mg) by mouth daily 30 tablet 3     busPIRone (BUSPAR) 15 MG tablet Take 15 mg by mouth 3 times daily       cloNIDine (CATAPRES) 0.1 MG tablet Take 1 tablet (0.1 mg) by mouth 4 times daily as needed (anxiety) 30 tablet 3     estradiol (ESTRACE) 1 MG tablet Take 1 tablet (1 mg) by mouth daily 90 tablet 3     ibuprofen (ADVIL/MOTRIN) 200 MG capsule Take 3 capsules (600 mg) by mouth every 6 hours as needed for fever 90 capsule 3     levothyroxine (SYNTHROID/LEVOTHROID) 137 MCG tablet Take 1 tablet (137 mcg) by mouth daily 30 tablet 0     prazosin (MINIPRESS) 2 MG capsule Take 2 capsules (4 mg) by mouth At Bedtime 30 capsule 3     Prenatal Vit-Fe Fumarate-FA (PRENATAL VITAMIN) 27-0.8 MG TABS Take 1 tablet by mouth daily 90 tablet 11     QUEtiapine  "(SEROQUEL) 200 MG tablet Take 200 mg by mouth       ranitidine (ZANTAC) 150 MG tablet Take 150 mg by mouth as needed Take as needed for reflux        sertraline (ZOLOFT) 100 MG tablet Take 1 tablet (100 mg) by mouth daily Takes 150 mg daily (1.5 tab) 30 tablet 3     estradiol (ESTRACE) 2 MG tablet Take 1 tablet (2 mg) by mouth daily (Patient not taking: Reported on 1/10/2020) 30 tablet 1     nicotine (NICODERM CQ) 14 MG/24HR 24 hr patch Place 1 patch onto the skin every 24 hours (Patient not taking: Reported on 1/10/2020) 28 patch 3   ,   No Known Allergies.    Patient is an established patient of this clinic..         Review of Systems:   Review of Systems   Constitutional: Positive for activity change, appetite change, fatigue and unexpected weight change. Negative for fever.   Respiratory: Negative for cough, shortness of breath and wheezing.    Cardiovascular: Negative for chest pain.   Gastrointestinal: Negative for abdominal pain, blood in stool, constipation, diarrhea, nausea and vomiting.   Genitourinary: Negative for dysuria and hematuria.   Musculoskeletal: Negative for arthralgias and myalgias.   Neurological: Negative for weakness and headaches.   Psychiatric/Behavioral: Negative for self-injury, sleep disturbance and suicidal ideas. The patient is nervous/anxious.             Physical Exam:     Vitals:    01/10/20 1051   BP: 107/78   Pulse: 91   Resp: 16   Temp: 97.6  F (36.4  C)   TempSrc: Oral   SpO2: 98%   Weight: 112 kg (247 lb)   Height: 1.61 m (5' 3.39\")     Body mass index is 43.22 kg/m .  Vitals were reviewed and were normal     Physical Exam  Constitutional:       General: She is not in acute distress.     Appearance: She is well-developed.   HENT:      Head: Normocephalic and atraumatic.   Eyes:      Conjunctiva/sclera: Conjunctivae normal.   Cardiovascular:      Rate and Rhythm: Normal rate and regular rhythm.      Heart sounds: Normal heart sounds.   Pulmonary:      Effort: Pulmonary effort " "is normal. No respiratory distress.   Abdominal:      General: Bowel sounds are normal. There is no distension.      Palpations: Abdomen is soft.      Tenderness: There is no abdominal tenderness.   Musculoskeletal: Normal range of motion.   Skin:     General: Skin is warm.      Capillary Refill: Capillary refill takes less than 2 seconds.      Findings: No rash.   Neurological:      Mental Status: She is alert and oriented to person, place, and time.   Psychiatric:      Comments: Mood \"eh\" congruent with affect. Well dressed, good eye contact. Mildly flat speech. Poor insight            Results:      Results from this visit  Results for orders placed or performed in visit on 01/10/20   Hemoglobin A1c ([facility])     Status: None   Result Value Ref Range    Hemoglobin A1C 4.8 4.1 - 5.7 %       Assessment and Plan        1. Abnormal weight gain  A1c within normal limits. TSH, cortisol also normal but TPO abs high. Will defer to endocrinology on further work up of likely Hashimotos vs Grave's. Continue current thyroid replacement. No other sx of rheumatologic or other auto immune condition of ROS. When patient returns in March after endocrinology appt will likely send to weight loss clinic next if weight gain continues. Will try and see if [facility] can aid in caloric counting.   - Hemoglobin A1c ([facility])    2. Gastroesophageal reflux disease with esophagitis    - famotidine (PEPCID) 10 MG tablet; Take 1 tablet (10 mg) by mouth 2 times daily as needed  Dispense: 30 tablet; Refill: 3  Changed from ranitidine. PRN for reflux     There are no discontinued medications.    Options for treatment and follow-up care were reviewed with the patient. Autumn Campos  engaged in the decision making process and verbalized understanding of the options discussed and agreed with the final plan.    Ellis Reynoso MD  "

## 2020-01-10 NOTE — PATIENT INSTRUCTIONS
1. Ask the staff about calorie counting for the next 2 weeks. Is there anyway they can help with this.     2. Try room at 65 degrees at bedtime     3. Will call [facility] and start on metformin if in pre diabetes or diabetic range.     Thank you for coming to [facility].    **If you had lab testing today and your results are reassuring or normal they will be be mailed to you or sent to your MyChart and you will be notified by email within 7 days.   **If the lab tests need quick action we will call you with the results.  The phone number we will call with results is # [phone] (home)    (home) . If this is not the best number please call our clinic and change the number.  **If any referrals were ordered today you should be getting a call in the next week.  If you don't hear about this within a week please call one of the following numbers   If your referral is for [facility] please call [phone]  If your referral is to outside [facility] please call the clinic number ([phone]) and ask for your team care coordinator.  If you need any refills please call your pharmacy and they will contact us.  If you have any concerns about today's visit or wish to schedule another appointment  please call our office during normal business hours  [phone] (8-5:00 M-F)  If you have urgent medical concerns please call [phone] at any time of the day.  If you have a medical emergency please call [phone]    Again thank you for choosing [facility] and please let us know how we can best partner with you to improve your and your family's health.

## 2020-01-11 ASSESSMENT — ANXIETY QUESTIONNAIRES: GAD7 TOTAL SCORE: 21

## 2020-01-13 ASSESSMENT — ENCOUNTER SYMPTOMS
MYALGIAS: 0
SHORTNESS OF BREATH: 0
WHEEZING: 0
COUGH: 0
ABDOMINAL PAIN: 0
CONSTIPATION: 0
FEVER: 0
DYSURIA: 0
NAUSEA: 0
WEAKNESS: 0
FATIGUE: 1
ACTIVITY CHANGE: 1
BLOOD IN STOOL: 0
APPETITE CHANGE: 1
HEMATURIA: 0
HEADACHES: 0
ARTHRALGIAS: 0
NERVOUS/ANXIOUS: 1
UNEXPECTED WEIGHT CHANGE: 1
SLEEP DISTURBANCE: 0
VOMITING: 0
DIARRHEA: 0

## 2020-01-22 DIAGNOSIS — E03.8 OTHER SPECIFIED HYPOTHYROIDISM: ICD-10-CM

## 2020-01-22 RX ORDER — LEVOTHYROXINE SODIUM 137 UG/1
137 TABLET ORAL DAILY
Qty: 30 TABLET | Refills: 0 | OUTPATIENT
Start: 2020-01-22

## 2020-01-22 NOTE — TELEPHONE ENCOUNTER
"Requested Prescriptions   Pending Prescriptions Disp Refills     levothyroxine (SYNTHROID/LEVOTHROID) 137 MCG tablet 30 tablet 0     Sig: Take 1 tablet (137 mcg) by mouth daily       Thyroid Protocol Passed - 1/22/2020  9:16 AM        Passed - Patient is 12 years or older        Passed - Recent (12 mo) or future (30 days) visit within the authorizing provider's specialty     Patient has had an office visit with the authorizing provider or a provider within the authorizing providers department within the previous 12 mos or has a future within next 30 days. See \"Patient Info\" tab in inbasket, or \"Choose Columns\" in Meds & Orders section of the refill encounter.              Passed - Medication is active on med list        Passed - Normal TSH on file in past 12 months     Recent Labs   Lab Test 12/03/19  0837   TSH 2.56              Passed - No active pregnancy on record     If patient is pregnant or has had a positive pregnancy test, please check TSH.          Passed - No positive pregnancy test in past 12 months     If patient is pregnant or has had a positive pregnancy test, please check TSH.            Levothyroxine 137mcg Tablets        Last written prescription date: 12/19/2019        Last fill quantity: 30, # refills: 0        Last office visit: 12/3/2019        Future office visit: 2/11/2020 with Dr. Gutiérrez        Is this a controlled substance?  No           "

## 2020-01-22 NOTE — TELEPHONE ENCOUNTER
Left message on phone number listed for [facility] that Dr. George wanted a Thyroid level recheck to assure correct medication dosing.  Told to call and make appointment for Lab only at any of the [place] clinics.  Future Order placed  TSH  12/3/19: 2.56  11/19/19: 5.31  Refill request refused due to :   Appointment needed  Suzie Alvarez, RN on 1/22/2020 at 9:56 AM

## 2020-01-23 ENCOUNTER — TELEPHONE (OUTPATIENT)
Dept: OBGYN | Facility: CLINIC | Age: 35
End: 2020-01-23

## 2020-01-23 DIAGNOSIS — E03.8 OTHER SPECIFIED HYPOTHYROIDISM: ICD-10-CM

## 2020-01-23 PROBLEM — I10 HYPERTENSION: Status: ACTIVE | Noted: 2020-01-23

## 2020-01-23 LAB
T4 FREE SERPL-MCNC: 0.95 NG/DL (ref 0.76–1.46)
TSH SERPL DL<=0.005 MIU/L-ACNC: 3.15 MU/L (ref 0.4–4)

## 2020-01-23 RX ORDER — GABAPENTIN 100 MG/1
CAPSULE ORAL
COMMUNITY
Start: 2020-01-21

## 2020-01-23 RX ORDER — PRAZOSIN HYDROCHLORIDE 1 MG/1
CAPSULE ORAL
COMMUNITY
Start: 2020-01-21

## 2020-01-23 RX ORDER — SPIRONOLACTONE 50 MG/1
TABLET, FILM COATED ORAL
COMMUNITY
Start: 2020-01-15

## 2020-01-23 RX ORDER — ATOMOXETINE 80 MG/1
CAPSULE ORAL
COMMUNITY
Start: 2020-01-21

## 2020-01-23 RX ORDER — BUSPIRONE HYDROCHLORIDE 10 MG/1
TABLET ORAL
COMMUNITY
Start: 2020-01-03 | End: 2020-02-18

## 2020-01-23 RX ORDER — BUPROPION HYDROCHLORIDE 150 MG/1
TABLET ORAL
COMMUNITY
Start: 2020-01-21 | End: 2020-02-18 | Stop reason: DRUGHIGH

## 2020-01-24 ENCOUNTER — OFFICE VISIT (OUTPATIENT)
Dept: FAMILY MEDICINE | Facility: CLINIC | Age: 35
End: 2020-01-24
Payer: COMMERCIAL

## 2020-01-24 VITALS
BODY MASS INDEX: 43.23 KG/M2 | HEIGHT: 63 IN | SYSTOLIC BLOOD PRESSURE: 113 MMHG | TEMPERATURE: 97.8 F | WEIGHT: 244 LBS | OXYGEN SATURATION: 98 % | HEART RATE: 87 BPM | DIASTOLIC BLOOD PRESSURE: 80 MMHG

## 2020-01-24 DIAGNOSIS — Z79.899 POLYPHARMACY: ICD-10-CM

## 2020-01-24 DIAGNOSIS — R63.5 WEIGHT GAIN: ICD-10-CM

## 2020-01-24 DIAGNOSIS — F15.91 HISTORY OF METHAMPHETAMINE USE: ICD-10-CM

## 2020-01-24 DIAGNOSIS — F31.81 BIPOLAR 2 DISORDER (H): ICD-10-CM

## 2020-01-24 DIAGNOSIS — E03.8 OTHER SPECIFIED HYPOTHYROIDISM: Primary | ICD-10-CM

## 2020-01-24 DIAGNOSIS — E66.01 MORBID OBESITY (H): ICD-10-CM

## 2020-01-24 RX ORDER — LEVOTHYROXINE SODIUM 137 UG/1
137 TABLET ORAL DAILY
Qty: 30 TABLET | Refills: 0 | Status: CANCELLED | OUTPATIENT
Start: 2020-01-24

## 2020-01-24 RX ORDER — LEVOTHYROXINE SODIUM 150 UG/1
150 TABLET ORAL DAILY
Qty: 30 TABLET | Refills: 0 | Status: SHIPPED | OUTPATIENT
Start: 2020-01-24 | End: 2020-02-18

## 2020-01-24 ASSESSMENT — ENCOUNTER SYMPTOMS
SLEEP DISTURBANCE: 1
FEVER: 0
UNEXPECTED WEIGHT CHANGE: 1
AGITATION: 1
CHILLS: 0

## 2020-01-24 ASSESSMENT — PATIENT HEALTH QUESTIONNAIRE - PHQ9
SUM OF ALL RESPONSES TO PHQ QUESTIONS 1-9: 23
5. POOR APPETITE OR OVEREATING: NEARLY EVERY DAY

## 2020-01-24 ASSESSMENT — ANXIETY QUESTIONNAIRES
1. FEELING NERVOUS, ANXIOUS, OR ON EDGE: NEARLY EVERY DAY
3. WORRYING TOO MUCH ABOUT DIFFERENT THINGS: NEARLY EVERY DAY
5. BEING SO RESTLESS THAT IT IS HARD TO SIT STILL: NEARLY EVERY DAY
7. FEELING AFRAID AS IF SOMETHING AWFUL MIGHT HAPPEN: NEARLY EVERY DAY
GAD7 TOTAL SCORE: 21
IF YOU CHECKED OFF ANY PROBLEMS ON THIS QUESTIONNAIRE, HOW DIFFICULT HAVE THESE PROBLEMS MADE IT FOR YOU TO DO YOUR WORK, TAKE CARE OF THINGS AT HOME, OR GET ALONG WITH OTHER PEOPLE: EXTREMELY DIFFICULT
2. NOT BEING ABLE TO STOP OR CONTROL WORRYING: NEARLY EVERY DAY
6. BECOMING EASILY ANNOYED OR IRRITABLE: NEARLY EVERY DAY

## 2020-01-24 ASSESSMENT — MIFFLIN-ST. JEOR: SCORE: 1780.91

## 2020-01-24 NOTE — PROGRESS NOTES
Preceptor Attestation:   Patient seen, evaluated and discussed with the resident. I have verified the content of the note, which accurately reflects my assessment of the patient and the plan of care.   Supervising Physician:  Madhav Hewitt MD

## 2020-01-24 NOTE — PATIENT INSTRUCTIONS
Increased thyroid med today. Discussed that labs are not showing pre-diabetes and metformin is not indicated. Referred to bariatric medical clinic for further interventions.

## 2020-01-24 NOTE — PROGRESS NOTES
HPI       Autumn Campos is a 34 year old  who presents for   Chief Complaint   Patient presents with     Establish Care     Medication Request     Levothyroxine     Hypothyroidism Follow-up      Since last visit, patient describes the following symptoms: no hair loss, no skin changes, no constipation, no loose stools    Weight gain, 40 lbs in the last 4 months. Avoiding carbs and sugar    Does have history of PCOS with total hysterectomy in the past.    Sober from methamphetamine use since September, notes that weight gain is a trigger for relapse and she is worried about this.    Interested in metformin as people at treatment are on this too      +++++++    Mood follow up      Your mood since your last visit: Worsened     Medication?  Gabapentin, buspirone, bupropion, Seroquel, prazosin, Zoloft    Therapy?  Following with [place] [facility].    Exercise?  4 to 5 days a week    What is going well?  Sobriety    Life Stressors: Weight gain    Other associated symptoms :None    How is your sleep? Fair    New significant life event:No    Current substance use: None    Anxiety / Panic symptoms: Yes-       Recent PHQ-9 Scores:     PHQ-9 SCORE 11/19/2019 1/10/2020 1/24/2020   PHQ-9 Total Score 22 21 23   PHQ-A Total Score 21 - -     Recent CHELA-7 Scores:      CHELA-7 SCORE 11/19/2019 1/10/2020 1/24/2020   Total Score 21 21 21         PHQ 9 elevated, question #9 0, no SI.       Adherence  Medication side effects: no  How often is a medication missed? Never    Problem, Medication and Allergy Lists were reviewed and updated if needed..    Patient is an established patient of this clinic..         Review of Systems:   Review of Systems   Constitutional: Positive for unexpected weight change. Negative for chills and fever.   Endocrine: Negative for cold intolerance and heat intolerance.   Psychiatric/Behavioral: Positive for agitation and sleep disturbance.            Physical Exam:     Vitals:    01/24/20  "0809   BP: 113/80   BP Location: Left arm   Patient Position: Sitting   Cuff Size: Adult Large   Pulse: 87   Temp: 97.8  F (36.6  C)   TempSrc: Oral   SpO2: 98%   Weight: 110.7 kg (244 lb)   Height: 1.6 m (5' 3\")     Body mass index is 43.22 kg/m .  Vitals were reviewed and were normal     Physical Exam  MENTAL STATUS EXAM  Appearance: appropriate  Attitude: cooperative  Behavior: normal  Eye Contact: normal  Speech: normal  Orientation: oriented to person, place, time and situation  Mood: anxious   Affect: Congruent with mood  Thought Process: appropriate  Suicidal Ideation: reports no suicidal ideation  Hallucination: denies    Results:   Results from last visit:  Orders Only on 01/23/2020   Component Date Value Ref Range Status     T4 Free 01/23/2020 0.95  0.76 - 1.46 ng/dL Final     TSH 01/23/2020 3.15  0.40 - 4.00 mU/L Final       Assessment and Plan        Autumn was seen today for establish care and medication request.    Diagnoses and all orders for this visit:    Other specified hypothyroidism  -     levothyroxine (SYNTHROID/LEVOTHROID) 150 MCG tablet; Take 1 tablet (150 mcg) by mouth daily  Patient with continued weight gain, known hypothyroidism on thyroid replacement.  Reviewed TSH from yesterday which is within laboratory normal, but is on the upper limit of normal spectrum, and with replacement, would like TSH below 2.0.  Will increase thyroid medicine today to 150 mcg daily.  Patient to follow-up in 3 to 4 weeks for repeat TSH/T4 and adjustment or continuation of levothyroxine.    Weight gain  Morbid obesity (H)  -     BARIATRIC ADULT REFERRAL - INTERNAL given patient's continued weight gain, will refer to weight management/bariatrics at this time.  BMI is 43.22, which is morbidly obese.  Reviewed laboratories, and patient not yet diabetic or prediabetic.  Patient would prefer to initiate metformin, but no current clinical indication for metformin.  Will follow-up with PCP in 1 month.      History of " methamphetamine use  Bipolar 2 disorder (H)  Polypharmacy  Patient with complex psychiatric history including but not limited to bipolar disorder, anxiety disorder unspecified, history of methamphetamine use.  Patient is on multiple psychoactive medications and is at high risk for if not already suffering from polypharmacy.  Patient's psychoactive medications are prescribed by psychiatrist, and would recommend further evaluation for iatrogenic causes of weight gain.     There are no discontinued medications.    Options for treatment and follow-up care were reviewed with the patient. Autumn Campos  engaged in the decision making process and verbalized understanding of the options discussed and agreed with the final plan.    Riley Echols DO, MA  Family Medicine PGY-3  [facility], \[facility] [place] [facility]\"" Family Medicine

## 2020-01-25 ASSESSMENT — ANXIETY QUESTIONNAIRES: GAD7 TOTAL SCORE: 21

## 2020-02-11 ENCOUNTER — OFFICE VISIT (OUTPATIENT)
Dept: ENDOCRINOLOGY | Facility: CLINIC | Age: 35
End: 2020-02-11
Payer: COMMERCIAL

## 2020-02-11 ENCOUNTER — MEDICAL CORRESPONDENCE (OUTPATIENT)
Dept: HEALTH INFORMATION MANAGEMENT | Facility: CLINIC | Age: 35
End: 2020-02-11

## 2020-02-11 VITALS
HEIGHT: 63 IN | WEIGHT: 245 LBS | HEART RATE: 82 BPM | DIASTOLIC BLOOD PRESSURE: 77 MMHG | BODY MASS INDEX: 43.41 KG/M2 | SYSTOLIC BLOOD PRESSURE: 116 MMHG

## 2020-02-11 DIAGNOSIS — R63.5 WEIGHT GAIN: ICD-10-CM

## 2020-02-11 DIAGNOSIS — E66.01 MORBID OBESITY (H): ICD-10-CM

## 2020-02-11 DIAGNOSIS — E06.3 HYPOTHYROIDISM DUE TO HASHIMOTO'S THYROIDITIS: Primary | ICD-10-CM

## 2020-02-11 PROCEDURE — 99244 OFF/OP CNSLTJ NEW/EST MOD 40: CPT | Performed by: INTERNAL MEDICINE

## 2020-02-11 ASSESSMENT — MIFFLIN-ST. JEOR: SCORE: 1785.44

## 2020-02-11 NOTE — PATIENT INSTRUCTIONS
It is difficult to assess the effects of the levothyroxine medication   Soon after a dose change  Plan to continue current levothyroxine 0.15 mg daily dose  Repeat thyroid (and other) lab tests in 1 week at   Scheduled [facility] appointment on 2/18/20  Goal target TSH 0.4-3 range  Keep focus on diet, exercise and weight loss management    I will relay test results, plan with lab letter correspondence(s)  Contact our office if questions about plan

## 2020-02-11 NOTE — PROGRESS NOTES
Name: Autumn Campos is a 34 year old woman, seen at the request of Deedee George and Ellis Reynoso for evaluation of     Chief Complaint   Patient presents with     Thyroid Problem       HPI:  Recent issues:  Here for evaluation of thyroid problem  Reviewed medical history from patient and Epic chart record        10/2019. [facility]  Health history review, including prior NOE/BSO surgery in 5/2016 for tubo-ovarian abcess with chronic PID  Patient active with treatment program for methamphetamine use.  Discussion on hormone replacement therapy with estrogen medication  Routine labs revealed elevated TSH level, then low dose levothyroxine treatment initiated    11/22/19. Med evaluation with Dr. Ellis Reynoso/[facility]  Subsequent lab tests showed persistently elevated TSH levels, then levothyroxine dose increases  1/24/20 Levothyroxine dose change from 0.137 mg to 0.15 mg daily    Previous [facility] thyroid tests include:     Lab Test 01/23/20  0751 12/03/19  0837 11/19/19  1026 11/07/19  0937 10/24/19  1110 10/03/19  1029   TSH 3.15 2.56 5.31* 6.28* 7.72* 5.82*   T4 0.95  --  0.93 0.88 0.70* 0.85   TPO  --  3,868*  --   --   --   --      Additional health history:  Previous goiter or thyroid nodules: None  Neck radiation treatments:  None  Fam Hx thyroid disease:  None known    Recent [facility] labs include:  Lab Results   Component Value Date    TSH 3.15 01/23/2020    T4 0.95 01/23/2020    TPO 3,868 (H) 12/03/2019     Current dose:  Levothyroxine 0.15 mg daily    Recent symptoms:   Fatigue   Weight gain- 80#/2 years   Some depressed mood   No menses since hysterectomy  Other chronic illnesses include:   Hypertension:    Takes amlodipine, clonidine, prazosin, spironolactone meds, followed by PCP   H/O metamphet use:  Followed by PCP   Bipolar DO:   Takes Seroquel, sertraline meds, followed by PCP      [facility]ives at [facility]  Sees Dr. Ellis Reynoso/[facility] for general medicine evaluations.    PMH/PSH:  Past Medical History:   Diagnosis Date     Bipolar disorder (H)      GERD (gastroesophageal reflux disease)      History of methamphetamine abuse (H)     currently in treatment     History of PID      Hypertension      Hypothyroidism      Obesity      Right ankle pain      Past Surgical History:   Procedure Laterality Date     Total abodominal hysterectomy Bilateral     removed uterus, cervix, tubes and ovaries       Family Hx:  Family History   Problem Relation Age of Onset     No Known Problems Mother      Diabetes Father      No Known Problems Sister      No Known Problems Brother      No Known Problems Maternal Grandmother      No Known Problems Maternal Grandfather      No Known Problems Paternal Grandmother      No Known Problems Other          Social Hx:  Social History     Socioeconomic History     Marital status: Single     Spouse name: Not on file     Number of children: Not on file     Years of education: Not on file     Highest education level: Not on file   Occupational History     Not on file   Social Needs     Financial resource strain: Not on file     Food insecurity:     Worry: Not on file     Inability: Not on file     Transportation needs:     Medical: Not on file     Non-medical: Not on file   Tobacco Use     Smoking status: Former Smoker     Packs/day: 0.00     Last attempt to quit: 2019     Years since quittin.4     Smokeless tobacco: Never Used     Tobacco comment: Quit   Substance and Sexual Activity     Alcohol use: Not Currently     Drug use: Not Currently     Sexual activity: Not Currently     Partners: Male     Birth control/protection: Female Surgical     Comment: [facility]   Lifestyle     Physical activity:     Days per week: Not on file     Minutes per session: Not on file     Stress: Not on file   Relationships     Social connections:     Talks on phone: Not  "on file     Gets together: Not on file     Attends Scientologist service: Not on file     Active member of club or organization: Not on file     Attends meetings of clubs or organizations: Not on file     Relationship status: Not on file     Intimate partner violence:     Fear of current or ex partner: Not on file     Emotionally abused: Not on file     Physically abused: Not on file     Forced sexual activity: Not on file   Other Topics Concern     Parent/sibling w/ CABG, MI or angioplasty before 65F 55M? Not Asked   Social History Narrative     Not on file          MEDICATIONS:  has a current medication list which includes the following prescription(s): amlodipine, bupropion, buspirone, clonidine, estradiol, gabapentin, ibuprofen, levothyroxine, prazosin, prazosin, prenatal vitamin, quetiapine, sertraline, spironolactone, atomoxetine, and famotidine.    ROS:     ROS: 10 point ROS neg other than the symptoms noted above in the HPI.    GENERAL: fatigue, significant weight gain; denies fevers, chills, night sweats.   HEENT: no dysphagia, odonophagia, diplopia, neck pain  THYROID:  no apparent hyper or hypothyroid symptoms  CV: no chest pain, pressure, palpitations  LUNGS: no SOB, GUPTA, cough, wheezing   ABDOMEN: no diarrhea, constipation, abdominal pain  EXTREMITIES: no rashes, ulcers, edema  NEUROLOGY: no headaches, denies changes in vision, tingling, extremitiy numbness   MSK: ankle pain; denies muscle weakness  SKIN: no rashes or lesions  : no menses since hysterectomy, some hot flashes though less on HRT  PSYCH:  some depression, though recent stable mood  ENDOCRINE: no heat or cold intolerance    Physical Exam   VS: /77   Pulse 82   Ht 1.6 m (5' 3\")   Wt 111.1 kg (245 lb)   BMI 43.40 kg/m    GENERAL: AXOX3, NAD, broad torso, well dressed, answering questions appropriately, appears stated age.  THYROID:  normal gland, no apparent nodules or goiter  HEENT: neck non-tender, no exopthalmous, no proptosis, " EOMI  CV: RRR, no rubs, gallops, no murmurs  LUNGS: CTAB, no wheezes, rales, or ronchi  ABDOMEN: obese, soft, nontender  EXTREMITIES: no edema  NEUROLOGY: CN grossly intact, no tremors  MSK: grossly intact  SKIN: several extremity tatoos; no rashes, no lesions    LABS:    All pertinent notes, labs, and images personally reviewed by me.     A/P:  Encounter Diagnoses   Name Primary?     Hypothyroidism due to Hashimoto's thyroiditis Yes     Weight gain      Morbid obesity (H)        Comments:  Reviewed health history and hypothyroidism issues.  Previously elevated TSH and TPOAb level characteristic of primary hypothyroidism due to Hashimoto's thyroiditis  Suspect weight gain due to lower metabolism since stopping metamphet, antipsychotic med SE, and mildly low thyroid levels    Plan:  Discussed general issues with the hypothyroidism diagnosis and management  Reviewed thyroid gland anatomy and hormone physiology  Discussed lab tests used to assess patient thyroid hormone levels  Reviewed treatment options including levothyroxine medication, ideal dosing    Recommend:  Continue current levothyroxine 0.15 mg daily dose at this time  No lab testing today since not at steady-state with thyroid levels, but recheck in 1-2 weeks   [facility]   Goal target TSH 0.4- 3.0 range   Lab orders placed  No thyroid nodules or goiter noted on exam, no thyroid imaging needed at this time  Keep focus with diet, exercise, weight management  Consider [place] [facility] Referral to assist with diet plan, will defer idea to PCP  She prefers [facility] lab letter correspondence, will send summary and recommendations with letter soon    Keep regular f/u with OBGYN and PCP also  Addressed patient questions today    Patient Instructions   It is difficult to assess the effects of the levothyroxine medication   Soon after a dose change  Plan to continue current levothyroxine 0.15 mg daily dose  Repeat thyroid (and  other) lab tests in 1 week at   Scheduled [place] appointment on 2/18/20  Goal target TSH 0.4-3 range  Keep focus on diet, exercise and weight loss management    I will relay test results, plan with lab letter correspondence(s)  Contact our office if questions about plan    [facility] labs ordered today:   Orders Placed This Encounter   Procedures     TSH     T4 free     Hemoglobin A1c     Glucose     Testosterone total     Cortisol     Radiology/Consults ordered today: None    More than 50% of the time spent with Ms. Campos on counseling / coordinating her care.  Total appointment time was 50 minutes.    Follow-up:  CEDRIC Gutiérrez MD, MS  Endocrinology  [facility]    CC: Ellis Reynoso and Marielos George

## 2020-02-18 ENCOUNTER — OFFICE VISIT (OUTPATIENT)
Dept: FAMILY MEDICINE | Facility: CLINIC | Age: 35
End: 2020-02-18
Payer: COMMERCIAL

## 2020-02-18 VITALS
WEIGHT: 244 LBS | DIASTOLIC BLOOD PRESSURE: 81 MMHG | HEIGHT: 63 IN | TEMPERATURE: 99.1 F | BODY MASS INDEX: 43.23 KG/M2 | RESPIRATION RATE: 18 BRPM | SYSTOLIC BLOOD PRESSURE: 113 MMHG | HEART RATE: 95 BPM | OXYGEN SATURATION: 99 %

## 2020-02-18 DIAGNOSIS — E66.01 MORBID OBESITY (H): ICD-10-CM

## 2020-02-18 DIAGNOSIS — E03.8 OTHER SPECIFIED HYPOTHYROIDISM: Primary | ICD-10-CM

## 2020-02-18 DIAGNOSIS — I10 BENIGN ESSENTIAL HYPERTENSION: ICD-10-CM

## 2020-02-18 DIAGNOSIS — F15.91 HISTORY OF METHAMPHETAMINE USE: ICD-10-CM

## 2020-02-18 LAB
CORTIS SERPL-MCNC: 4.7 UG/DL (ref 4–22)
GLUCOSE CASUAL: 96 MG/DL (ref 51–200)
HBA1C MFR BLD: 5.2 % (ref 4.1–5.7)
T4 FREE SERPL-MCNC: 0.95 NG/DL (ref 0.76–1.46)
TSH SERPL DL<=0.005 MIU/L-ACNC: 4.14 MU/L (ref 0.4–4)

## 2020-02-18 RX ORDER — LEVOTHYROXINE SODIUM 150 UG/1
150 TABLET ORAL DAILY
Qty: 30 TABLET | Refills: 0 | Status: SHIPPED | OUTPATIENT
Start: 2020-02-18 | End: 2020-02-19

## 2020-02-18 RX ORDER — BUSPIRONE HYDROCHLORIDE 10 MG/1
10 TABLET ORAL 3 TIMES DAILY
COMMUNITY
Start: 2020-02-18 | End: 2020-02-21

## 2020-02-18 RX ORDER — AZELAIC ACID 0.2 G/G
CREAM CUTANEOUS
COMMUNITY
Start: 2020-01-22

## 2020-02-18 ASSESSMENT — ANXIETY QUESTIONNAIRES
2. NOT BEING ABLE TO STOP OR CONTROL WORRYING: NEARLY EVERY DAY
IF YOU CHECKED OFF ANY PROBLEMS ON THIS QUESTIONNAIRE, HOW DIFFICULT HAVE THESE PROBLEMS MADE IT FOR YOU TO DO YOUR WORK, TAKE CARE OF THINGS AT HOME, OR GET ALONG WITH OTHER PEOPLE: SOMEWHAT DIFFICULT
7. FEELING AFRAID AS IF SOMETHING AWFUL MIGHT HAPPEN: NOT AT ALL
1. FEELING NERVOUS, ANXIOUS, OR ON EDGE: NEARLY EVERY DAY
GAD7 TOTAL SCORE: 15
3. WORRYING TOO MUCH ABOUT DIFFERENT THINGS: NEARLY EVERY DAY
6. BECOMING EASILY ANNOYED OR IRRITABLE: NEARLY EVERY DAY
5. BEING SO RESTLESS THAT IT IS HARD TO SIT STILL: NOT AT ALL

## 2020-02-18 ASSESSMENT — PAIN SCALES - GENERAL: PAINLEVEL: NO PAIN (0)

## 2020-02-18 ASSESSMENT — PATIENT HEALTH QUESTIONNAIRE - PHQ9
SUM OF ALL RESPONSES TO PHQ QUESTIONS 1-9: 8
5. POOR APPETITE OR OVEREATING: NEARLY EVERY DAY

## 2020-02-18 ASSESSMENT — MIFFLIN-ST. JEOR: SCORE: 1780.91

## 2020-02-18 NOTE — PROGRESS NOTES
Preceptor Attestation:   Patient seen, evaluated and discussed with the resident. I have verified the content of the note, which accurately reflects my assessment of the patient and the plan of care.   Supervising Physician:  Blanca De La Fuente MD

## 2020-02-18 NOTE — PATIENT INSTRUCTIONS
1. Thyroid  Lab tests today. Will send letter. Will call if dose changes.  Follow up with Dr. Reynoso in 1 month     2. Weight mgt referral   Your provider has referred you to: [facility]: [facility] - [place] [phone]  [email or web address]

## 2020-02-18 NOTE — PROGRESS NOTES
HPI       Autumn Campos is a 34 year old  who presents for   Chief Complaint   Patient presents with     Weight Problem     Patient is complaining about he weight and following up with thyroid lab work        Hypothyroidism Follow-up      Since last visit, patient describes the following symptoms: Weight stable, no hair loss, no skin changes, no constipation, no loose stools    Patient notes she is still very overweight and does not like this.  Has not had any weight changes.      Adherence   Medication side effects: no    Social: Patient is in treatment at [place] [facility]     +++++++    Depression/Anxiety follow up      Did have recent medication changes today with psychiatry.  Psychiatry manages medications.  PHQ improving, please see below.     Recent PHQ-9 Scores:     PHQ-9 SCORE 1/10/2020 1/24/2020 2/18/2020   PHQ-9 Total Score 21 23 8   PHQ-A Total Score - - -     Recent CHELA-7 Scores:      CHELA-7 SCORE 1/10/2020 1/24/2020 2/18/2020   Total Score 21 21 15       Hypertension Follow-up  <140/90 is goal    Outpatient blood pressures are not being checked.    Chest Pain? :No     Low Salt Diet: no added salt    Daily NSAID Use?No     Did patient take their HTN pills today/last night as usual?  Yes    Last Basic Metabolic Panel:  Lab Results   Component Value Date     11/19/2019      Lab Results   Component Value Date    POTASSIUM 4.3 11/19/2019     Lab Results   Component Value Date    CHLORIDE 105 11/19/2019     Lab Results   Component Value Date    CHETNA 9.5 11/19/2019     Lab Results   Component Value Date    CO2 22 11/19/2019     Lab Results   Component Value Date    BUN 17 11/19/2019     Lab Results   Component Value Date    CR 0.72 11/19/2019     Lab Results   Component Value Date    GLC 78 11/19/2019         Problem, Medication and Allergy Lists were reviewed and updated if needed..    Patient is an established patient of this clinic..         Review of Systems:   Review of  "Systems         Physical Exam:     Vitals:    02/18/20 1507   BP: 113/81   Pulse: 95   Resp: 18   Temp: 99.1  F (37.3  C)   TempSrc: Oral   SpO2: 99%   Weight: 110.7 kg (244 lb)   Height: 1.6 m (5' 3\")     Body mass index is 43.22 kg/m .  Vitals were reviewed and were normal     Physical Exam   General: Alert and oriented, in no acute distress.  Patient is obese.  Skin: Warm and dry, no abnormalities noted.  Eyes: Extra-ocular muscles intact, pupils equal and reactive.  ENT: Speech intact, nasal passages open, no hearing impairment noted.  CV: No cyanosis or pallor, warm and well perfused.  Respiratory: No respiratory distress, no accessory muscle use.  Neuro: Gait and station normal, comprehension intact. Gross and fine motor skills intact.   Psychiatric: Mood and affect appear normal.   Extremities: Warm, able to move all four extremities at will.    MENTAL STATUS EXAM  Appearance: appropriate  Attitude: cooperative  Behavior: normal  Eye Contact: normal  Speech: normal  Orientation: oriented to person, place, time and situation  Mood: normal  Affect: Congruent with mood  Thought Process: appropriate  Suicidal Ideation: reports no suicidal ideation  Hallucination: denies    Results:      Results from this visit  Results for orders placed or performed in visit on 02/18/20   Glucose Casual ([facility])     Status: None   Result Value Ref Range    Glucose Casual 96.0 51.0 - 200.0 mg/dL   Hemoglobin A1c ([facility])     Status: None   Result Value Ref Range    Hemoglobin A1C 5.2 4.1 - 5.7 %   Additional laboratories ordered and pending.    Assessment and Plan        Autumn was seen today for weight problem.    Diagnoses and all orders for this visit:    Other specified hypothyroidism  Morbid obesity (H)  -     levothyroxine 150 MCG PO tablet; Take 1 tablet (150 mcg) by mouth daily  -     TSH  -     T4 free  -     Glucose Casual ([facility])  -     Hemoglobin A1c ([facility])  -     Testosterone total  -     Cortisol  Patient " recently seen by endocrinology who recommended continuing current dose of thyroid medication and recheck of thyroid studies today.  Also wanted A1c, glucose, testosterone, cortisol.  A1c and glucose reassuring as above.  Other studies pending at this time.  Patient has not followed up with bariatrics clinic, and will work with care coordinator to schedule today.  Patient is frustrated that there are not further answers from endocrine, provided reassurance, answered elicited questions.      Benign essential hypertension  Patient with well-controlled hypertension.  Recently changed from oral to patches of clonidine.    History of methamphetamine use  Patient and treatment for history of methamphetamine use at [facility].  Follows with psychiatry to manage his psychoactive medications.  Has a recent occasion changes, but improving PHQ and CHELA scores.  We will continue to follow with primary care provider and psychiatry.    Patient to follow-up with primary care provider in 4 weeks.     Medications Discontinued During This Encounter   Medication Reason     buPROPion (WELLBUTRIN XL) 150 MG 24 hr tablet Dose adjustment     sertraline (ZOLOFT) 100 MG tablet Dose adjustment     cloNIDine (CATAPRES) 0.1 MG tablet Dose adjustment     busPIRone (BUSPAR) 10 MG tablet      levothyroxine (SYNTHROID/LEVOTHROID) 150 MCG tablet Reorder       Options for treatment and follow-up care were reviewed with the patient. Autumn Campos  engaged in the decision making process and verbalized understanding of the options discussed and agreed with the final plan.    Riley Echols DO, MA  Family Medicine PGY-3  [facility], [facility]

## 2020-02-19 ENCOUNTER — TELEPHONE (OUTPATIENT)
Dept: FAMILY MEDICINE | Facility: CLINIC | Age: 35
End: 2020-02-19

## 2020-02-19 DIAGNOSIS — E03.8 OTHER SPECIFIED HYPOTHYROIDISM: ICD-10-CM

## 2020-02-19 RX ORDER — LEVOTHYROXINE SODIUM 175 UG/1
175 TABLET ORAL DAILY
Qty: 60 TABLET | Refills: 0 | Status: SHIPPED | OUTPATIENT
Start: 2020-02-19 | End: 2020-04-13

## 2020-02-19 ASSESSMENT — ANXIETY QUESTIONNAIRES: GAD7 TOTAL SCORE: 15

## 2020-02-19 NOTE — TELEPHONE ENCOUNTER
TSH above goal of 3.0. Follow up for recheck TSH/T4 in 4-6 weeks with PCP.  Please call [place] [facility] to set up appt.     Riley Echols, DO

## 2020-02-20 LAB — TESTOST SERPL-MCNC: 3 NG/DL (ref 8–60)

## 2020-02-21 ENCOUNTER — TELEPHONE (OUTPATIENT)
Dept: FAMILY MEDICINE | Facility: CLINIC | Age: 35
End: 2020-02-21

## 2020-02-21 RX ORDER — BUSPIRONE HYDROCHLORIDE 10 MG/1
20 TABLET ORAL 3 TIMES DAILY
COMMUNITY
Start: 2020-02-21

## 2020-02-21 NOTE — TELEPHONE ENCOUNTER
[facility] phone call message- medication clarification/question:    Full Medication Name: busPIRone 10 MG PO tablet   Dose: Take 1 tablet (10 mg) by mouth 3 times daily - Oral    Question/Clarification needed: Gemma with [facility] called to get clarification on the dosage change for this medication.     Pharmacy confirmed as   [id number] - [place], [place] - [address]  [address]  [place] [address]  Phone: [phone] Fax: [phone]  : Yes    Please leave ONLY preferred pharmacy    OK to leave a message on voice mail? Yes    Advised patient that RN would call back within 3 hours, unless emergent.    Primary language: English      needed? No    Call taken on February 21, 2020 at 9:24 AM by Cindy Iqbal to [facility]

## 2020-02-21 NOTE — TELEPHONE ENCOUNTER
RN called and spoke with Gemma who relayed that patient has her Buspar managed by her mental health provider Darby Everett, and the dosing is different. It is 20 mg TID. They do not need us to manage this medication. RN updated this in the system. She requested that I fax over the change to the levothyroxine dose so they have it on file. RN sent it over to [phone].    Routing to provider and PCP as an FYI that we don't manage medication and that it has been corrected in chart.  Danelle Lamas RN

## 2020-02-28 ENCOUNTER — OFFICE VISIT (OUTPATIENT)
Dept: FAMILY MEDICINE | Facility: CLINIC | Age: 35
End: 2020-02-28
Payer: COMMERCIAL

## 2020-02-28 VITALS
OXYGEN SATURATION: 98 % | RESPIRATION RATE: 16 BRPM | HEART RATE: 86 BPM | BODY MASS INDEX: 44.3 KG/M2 | WEIGHT: 250 LBS | SYSTOLIC BLOOD PRESSURE: 107 MMHG | DIASTOLIC BLOOD PRESSURE: 74 MMHG | TEMPERATURE: 97.5 F | HEIGHT: 63 IN

## 2020-02-28 DIAGNOSIS — I10 BENIGN ESSENTIAL HYPERTENSION: Primary | ICD-10-CM

## 2020-02-28 RX ORDER — CLONIDINE HYDROCHLORIDE 0.3 MG/1
0.3 TABLET ORAL DAILY
COMMUNITY
Start: 2020-02-25

## 2020-02-28 RX ORDER — DESVENLAFAXINE 50 MG/1
50 TABLET, FILM COATED, EXTENDED RELEASE ORAL DAILY
COMMUNITY
Start: 2020-02-26

## 2020-02-28 ASSESSMENT — MIFFLIN-ST. JEOR: SCORE: 1808.12

## 2020-02-28 NOTE — PROGRESS NOTES
HPI       Autumn Campos is a 34 year old  who presents for   Chief Complaint   Patient presents with     Refill Request     Amlodipine     Clonidine was increased to patch 0.3 patch then moved to 0.3 tablet to help w anxiety. Hasnt had any symptoms of low BP. We only need to refill amlodipine today. Otherwise things going well at [facility]. NO CP, Sob, dizziness, orthostatic sx or vertigo         Problem, Medication and Allergy Lists were      Patient Active Problem List    Diagnosis Date Noted     Hypertension 01/23/2020     Priority: Medium     Pain in joint involving ankle and foot, left 12/19/2019     Priority: Medium     History of total hysterectomy 11/22/2019     Priority: Medium     Benign essential hypertension 11/22/2019     Priority: Medium     Morbid obesity (H) 11/07/2019     Priority: Medium     History of methamphetamine use 10/03/2019     Priority: Medium     Flexor tendon laceration, finger, open wound 01/30/2015     Priority: Medium     Papanicolaou smear of cervix with low grade squamous intraepithelial lesion (LGSIL) 01/01/2004     Priority: Medium   ,     Current Outpatient Medications   Medication Sig Dispense Refill     atomoxetine (STRATTERA) 80 MG capsule        AZELEX 20 % EX external cream        busPIRone 10 MG PO tablet Take 2 tablets (20 mg) by mouth 3 times daily Managed by Darby Everett ( MD)       cloNIDine (CATAPRES) 0.3 MG tablet Take 0.3 mg by mouth daily       desvenlafaxine (PRISTIQ) 50 MG 24 hr tablet Take 50 mg by mouth daily       estradiol (ESTRACE) 1 MG tablet Take 1 tablet (1 mg) by mouth daily 90 tablet 3     famotidine (PEPCID) 10 MG tablet Take 1 tablet (10 mg) by mouth 2 times daily as needed 30 tablet 3     gabapentin (NEURONTIN) 100 MG capsule        ibuprofen (ADVIL/MOTRIN) 200 MG capsule Take 3 capsules (600 mg) by mouth every 6 hours as needed for fever 90 capsule 3     levothyroxine 175 MCG PO tablet Take 1 tablet (175 mcg) by mouth daily 60  "tablet 0     prazosin (MINIPRESS) 1 MG capsule        prazosin (MINIPRESS) 2 MG capsule Take 2 capsules (4 mg) by mouth At Bedtime 30 capsule 3     Prenatal Vit-Fe Fumarate-FA (PRENATAL VITAMIN) 27-0.8 MG TABS Take 1 tablet by mouth daily 90 tablet 11     QUEtiapine (SEROQUEL) 200 MG tablet Take 200 mg by mouth       spironolactone (ALDACTONE) 50 MG tablet      ,   No Known Allergies.    Patient is an established patient of this clinic..         Review of Systems:   Review of Systems         Physical Exam:     Vitals:    02/28/20 1420   BP: 107/74   BP Location: Left arm   Patient Position: Sitting   Cuff Size: Adult Large   Pulse: 86   Resp: 16   Temp: 97.5  F (36.4  C)   TempSrc: Oral   SpO2: 98%   Weight: 113.4 kg (250 lb)   Height: 1.6 m (5' 3\")     Body mass index is 44.29 kg/m .  Vitals were reviewed and were normal     Physical Exam  Constitutional:       General: She is not in acute distress.     Appearance: She is well-developed.   HENT:      Head: Normocephalic and atraumatic.   Eyes:      Conjunctiva/sclera: Conjunctivae normal.   Cardiovascular:      Rate and Rhythm: Normal rate and regular rhythm.      Heart sounds: Normal heart sounds. No murmur. No friction rub.   Pulmonary:      Effort: Pulmonary effort is normal. No respiratory distress.   Abdominal:      General: Bowel sounds are normal. There is no distension.      Palpations: Abdomen is soft.      Tenderness: There is no abdominal tenderness.   Musculoskeletal: Normal range of motion.      Comments: No leg edema   Skin:     General: Skin is warm.      Capillary Refill: Capillary refill takes less than 2 seconds.      Findings: No rash.   Neurological:      General: No focal deficit present.      Mental Status: She is alert and oriented to person, place, and time.           Results:   No testing ordered today    Assessment and Plan        1. Benign essential hypertension  Patient recently had clonidine overall dose changed. BP well below goal w " systolics in 100s. Will keep clonidine on since helping anxiety symptoms. Discontinue amlodipine. Will check BP at follow up visit         There are no discontinued medications.    Options for treatment and follow-up care were reviewed with the patient. Autumn Campos  engaged in the decision making process and verbalized understanding of the options discussed and agreed with the final plan.    Ellis Reynoso MD

## 2020-02-28 NOTE — PATIENT INSTRUCTIONS
Due to how good your BP looks we are going to stop amlodipine :) !!    Thank you for coming to [facility].    **If you had lab testing today and your results are reassuring or normal they will be be mailed to you or sent to your MyChart and you will be notified by email within 7 days.   **If the lab tests need quick action we will call you with the results.  The phone number we will call with results is # [phone] (home)    (home) . If this is not the best number please call our clinic and change the number.  **If any referrals were ordered today you should be getting a call in the next week.  If you don't hear about this within a week please call one of the following numbers   If your referral is for [facility] please call [phone]  If your referral is to outside [facility] please call the clinic number ([phone]) and ask for your team care coordinator.  If you need any refills please call your pharmacy and they will contact us.  If you have any concerns about today's visit or wish to schedule another appointment  please call our office during normal business hours  [phone] (8-5:00 M-F)  If you have urgent medical concerns please call [phone] at any time of the day.  If you have a medical emergency please call [phone]    Again thank you for choosing [facility] and please let us know how we can best partner with you to improve your and your family's health.

## 2020-02-28 NOTE — PROGRESS NOTES
Preceptor Attestation:   Patient seen, evaluated and discussed with the resident. I have verified the content of the note, which accurately reflects my assessment of the patient and the plan of care.   Supervising Physician:  Becky Webb MD

## 2020-04-13 DIAGNOSIS — E03.8 OTHER SPECIFIED HYPOTHYROIDISM: ICD-10-CM

## 2020-04-13 RX ORDER — LEVOTHYROXINE SODIUM 175 UG/1
175 TABLET ORAL DAILY
Qty: 60 TABLET | Refills: 0 | Status: SHIPPED | OUTPATIENT
Start: 2020-04-13 | End: 2020-06-15

## 2020-04-13 NOTE — TELEPHONE ENCOUNTER

## 2020-06-15 DIAGNOSIS — E03.8 OTHER SPECIFIED HYPOTHYROIDISM: ICD-10-CM

## 2020-06-15 RX ORDER — LEVOTHYROXINE SODIUM 175 UG/1
175 TABLET ORAL DAILY
Qty: 60 TABLET | Refills: 0 | Status: SHIPPED | OUTPATIENT
Start: 2020-06-15 | End: 2020-07-31

## 2020-06-15 NOTE — TELEPHONE ENCOUNTER

## 2020-07-30 ENCOUNTER — OFFICE VISIT (OUTPATIENT)
Dept: FAMILY MEDICINE | Facility: CLINIC | Age: 35
End: 2020-07-30
Payer: COMMERCIAL

## 2020-07-30 VITALS
HEART RATE: 95 BPM | RESPIRATION RATE: 16 BRPM | TEMPERATURE: 98.2 F | BODY MASS INDEX: 43.62 KG/M2 | WEIGHT: 246.2 LBS | DIASTOLIC BLOOD PRESSURE: 82 MMHG | HEIGHT: 63 IN | SYSTOLIC BLOOD PRESSURE: 113 MMHG | OXYGEN SATURATION: 99 %

## 2020-07-30 DIAGNOSIS — K21.00 GASTROESOPHAGEAL REFLUX DISEASE WITH ESOPHAGITIS: ICD-10-CM

## 2020-07-30 DIAGNOSIS — E55.9 VITAMIN D DEFICIENCY: ICD-10-CM

## 2020-07-30 DIAGNOSIS — E03.8 OTHER SPECIFIED HYPOTHYROIDISM: Primary | ICD-10-CM

## 2020-07-30 LAB
T4 FREE SERPL-MCNC: 1.62 NG/DL (ref 0.76–1.46)
TSH SERPL DL<=0.005 MIU/L-ACNC: 0.09 MU/L (ref 0.4–4)

## 2020-07-30 RX ORDER — BUPROPION HYDROCHLORIDE 150 MG/1
TABLET, EXTENDED RELEASE ORAL
COMMUNITY
Start: 2020-07-21

## 2020-07-30 RX ORDER — METFORMIN HCL 500 MG
TABLET, EXTENDED RELEASE 24 HR ORAL
COMMUNITY
Start: 2020-07-06

## 2020-07-30 ASSESSMENT — ENCOUNTER SYMPTOMS
MYALGIAS: 0
DYSURIA: 0
COUGH: 0
WEAKNESS: 0
FEVER: 0
ARTHRALGIAS: 0
NAUSEA: 0
HEADACHES: 0
CONSTIPATION: 0
WHEEZING: 0
VOMITING: 0
HEMATURIA: 0
UNEXPECTED WEIGHT CHANGE: 0
SHORTNESS OF BREATH: 0
ABDOMINAL PAIN: 0
BLOOD IN STOOL: 0
DIARRHEA: 0

## 2020-07-30 ASSESSMENT — MIFFLIN-ST. JEOR: SCORE: 1790.89

## 2020-07-30 NOTE — PROGRESS NOTES
HPI       Autumn Campos is a 34 year old  who presents for   Chief Complaint   Patient presents with     Follow Up     Thyroid levels, and pt requesting to be prescribed Vitamins ( D and collagen)     Forms     [facility]       Almost done with [facility] 3 months left! Lives in [facility], ready to get back to see 15 yo daughter         Medicine Question - 5000 U vit D was taking after hysterectomy. Wondering should be back on it.     Thyroid symptoms - usually has fogginess when thyroid is off. Overall is doing okay. 6 hours of sleep total. Wakes up on her own not to use bathroom.  Goal for thyroid is 3. Takes levothyroxine in AM.     Insomnia- Weaned 250 down to 125 once at bedtime. Sleep unchanged w the wean down. Has her own room unsure if she snores. Feels fatigued after awakening. No napping during daytime, no caffeine. Chronic long term issue for her. Needs sleep study, easier to arrange in 3 months. Overall sleep moderately better now than last time.     Problem, Medication and Allergy Lists were reviewed and updated if needed..    Patient is an established patient of this clinic..         Review of Systems:   Review of Systems   Constitutional: Positive for fatigue. Negative for fever and unexpected weight change.   Respiratory: Negative for cough, shortness of breath and wheezing.    Cardiovascular: Negative for chest pain.   Gastrointestinal: Negative for abdominal pain, blood in stool, constipation, diarrhea, nausea and vomiting.   Genitourinary: Negative for dysuria and hematuria.   Musculoskeletal: Negative for arthralgias and myalgias.   Neurological: Negative for weakness and headaches.   Psychiatric/Behavioral: Positive for sleep disturbance. Negative for self-injury. The patient is not nervous/anxious.             Physical Exam:     Vitals:    07/30/20 1527   BP: 113/82   Pulse: 95   Resp: 16   Temp: 98.2  F (36.8  C)   TempSrc: Oral   SpO2: 99%   Weight: 111.7 kg  "(246 lb 3.2 oz)   Height: 1.6 m (5' 3\")     Body mass index is 43.61 kg/m .  Vitals were reviewed and were normal     Physical Exam  Constitutional:       General: She is not in acute distress.     Appearance: She is well-developed.   HENT:      Head: Normocephalic and atraumatic.   Eyes:      Conjunctiva/sclera: Conjunctivae normal.   Cardiovascular:      Rate and Rhythm: Normal rate and regular rhythm.      Heart sounds: Normal heart sounds.   Pulmonary:      Effort: Pulmonary effort is normal. No respiratory distress.   Abdominal:      General: Bowel sounds are normal. There is no distension.      Palpations: Abdomen is soft.      Tenderness: There is no abdominal tenderness.   Musculoskeletal: Normal range of motion.   Skin:     General: Skin is warm.      Capillary Refill: Capillary refill takes less than 2 seconds.      Findings: No rash.   Neurological:      Mental Status: She is alert and oriented to person, place, and time.           Results:     Office Visit on 02/18/2020   Component Date Value Ref Range Status     TSH 02/18/2020 4.14* 0.40 - 4.00 mU/L Final     T4 Free 02/18/2020 0.95  0.76 - 1.46 ng/dL Final     Glucose Casual 02/18/2020 96.0  51.0 - 200.0 mg/dL Final     Hemoglobin A1C 02/18/2020 5.2  4.1 - 5.7 % Final     Testosterone Total 02/18/2020 3* 8 - 60 ng/dL Final    Comment: This test was developed and its performance characteristics determined by the   [facility].   It has not been cleared or approved by the FDA. The laboratory is regulated   under CLIA as qualified to perform high-complexity testing. This test is used   for clinical purposes. It should not be regarded as investigational or for   research.       Cortisol Serum 02/18/2020 4.7  4 - 22 ug/dL Final    Comment: 8 AM Cortisol Reference Range = 4-22 ug/dL  4 PM Cortisol Reference Range = 3-17 ug/dL           Assessment and Plan         1. Other specified " hypothyroidism  Over replaced based on labs collected today. Dec dose to 150mcg and recheck in 8 weeks.     2. Gastroesophageal reflux disease with esophagitis  Well controlled at this time off PPI now tolerating just PRN H2.     3. Vitamin D deficiency  Hx of. Will check and see if needs replacement.     4, Insomnia   Will delay sleep study for 3months until patient out. Also discussed [employer] korina to try once able to use smart phone again. Continue slow wean of seroquel as sleep allows.        There are no discontinued medications.    Options for treatment and follow-up care were reviewed with the patient. Autumn Campos  engaged in the decision making process and verbalized understanding of the options discussed and agreed with the final plan.    Ellis Reynoso MD

## 2020-07-30 NOTE — PROGRESS NOTES
Preceptor Attestation:   Patient seen and discussed with the resident. Assessment and plan reviewed with resident and agreed upon.   Supervising Physician:  Margarito Nguyen MD  [facility]

## 2020-07-30 NOTE — LETTER
Autumn Campos  [address] [place] [place] [address]          To whom it may concern,    Recently saw Mrs. Campos for follow up on thyroid testing. Her thyroid tests showed we were mildly over replacing her thyroid hormone. I want her to stop the 175 mcg tablets and take 150 instead. She should come back to see me in 8 weeks to recheck levels at this dose.           Office Visit on 02/18/2020   Component Date Value Ref Range Status     TSH 02/18/2020 4.14* 0.40 - 4.00 mU/L Final     T4 Free 02/18/2020 0.95  0.76 - 1.46 ng/dL Final                                             Ellis Reynoso MD

## 2020-07-31 LAB — DEPRECATED CALCIDIOL+CALCIFEROL SERPL-MC: 33 UG/L (ref 20–75)

## 2020-07-31 RX ORDER — LEVOTHYROXINE SODIUM 150 UG/1
150 TABLET ORAL DAILY
Qty: 90 TABLET | Refills: 3 | Status: SHIPPED | OUTPATIENT
Start: 2020-07-31

## 2020-07-31 ASSESSMENT — ENCOUNTER SYMPTOMS
NERVOUS/ANXIOUS: 0
SLEEP DISTURBANCE: 1
FATIGUE: 1

## 2021-01-13 DIAGNOSIS — R23.2 HOT FLASHES: ICD-10-CM

## 2021-01-13 RX ORDER — ESTRADIOL 1 MG/1
1 TABLET ORAL DAILY
Qty: 90 TABLET | Refills: 3 | Status: SHIPPED | OUTPATIENT
Start: 2021-01-13

## 2021-01-13 NOTE — TELEPHONE ENCOUNTER
"Request for medication refill:  estradiol (ESTRACE) 1 MG tablet    Providers if patient needs an appointment and you are willing to give a one month supply please refill for one month and  send a letter/MyChart using \".SMILLIMITEDREFILL\" .smillimited and route chart to \"P SMI \" (Giving one month refill in non controlled medications is strongly recommended before denial)    If refill has been denied, meaning absolutely no refills without visit, please complete the smart phrase \".smirxrefuse\" and route it to the \"P SMI MED REFILLS\"  pool to inform the patient and the pharmacy.    Melia Munoz MA        "

## 2021-03-23 DIAGNOSIS — E03.8 OTHER SPECIFIED HYPOTHYROIDISM: ICD-10-CM

## 2021-03-23 RX ORDER — PNV NO.95/FERROUS FUM/FOLIC AC 28MG-0.8MG
1 TABLET ORAL DAILY
Qty: 90 TABLET | Refills: 3 | Status: SHIPPED | OUTPATIENT
Start: 2021-03-23 | End: 2022-06-24

## 2021-03-23 NOTE — TELEPHONE ENCOUNTER

## 2021-08-18 DIAGNOSIS — E03.8 OTHER SPECIFIED HYPOTHYROIDISM: ICD-10-CM

## 2021-08-18 RX ORDER — LEVOTHYROXINE SODIUM 150 UG/1
150 TABLET ORAL DAILY
Qty: 90 TABLET | Refills: 3 | OUTPATIENT
Start: 2021-08-18

## 2021-08-18 NOTE — TELEPHONE ENCOUNTER
Medication Refill Denied  Reason: Patient needs: provider visit and lab tests, TSH  Provider: I have not called the patient about the Rx denial, please call.  PCS: Please notify the pharmacy, Please contact the patient to explain reasoning provided above and to schedule the patient for a provider visit and lab tests, TSH with any provider..    Once patient makes an appointment please send back to me to    order temporary refill so that the patient will not run out of medication prior to the scheduled visit.    Amna Gonzalez, DO

## 2021-08-18 NOTE — TELEPHONE ENCOUNTER
"Request for medication refill:    levoythorixne 150 MCG    Providers if patient needs an appointment and you are willing to give a one month supply please refill for one month and  send a letter/MyChart using \".SMILLIMITEDREFILL\" .smillimited and route chart to \"P SMI \" (Giving one month refill in non controlled medications is strongly recommended before denial)    If refill has been denied, meaning absolutely no refills without visit, please complete the smart phrase \".smirxrefuse\" and route it to the \"P SMI MED REFILLS\"  pool to inform the patient and the pharmacy.    Leslie Bauer, CMA        "

## 2021-08-18 NOTE — LETTER
August 20, 2021      Autumn Campos  [address]  [place] [address]        Dear Autumn,    We received a refill request from your pharmacy for Levothyroxine. We will not be able to fill this medication until you come in for a follow up appointment with any provider at hospitals and have some routine labs performed to check your thyroid function.     Please call our clinic at [phone] to schedule an appointment.     Sincerely,    Amna Gonzalez, DO

## 2021-08-20 NOTE — TELEPHONE ENCOUNTER
Patient does not have phone number on file. Sent letter informing patient of need to make a follow up appointment for further refills.

## 2022-02-14 DIAGNOSIS — R23.2 HOT FLASHES: ICD-10-CM

## 2022-02-15 RX ORDER — ESTRADIOL 1 MG/1
1 TABLET ORAL DAILY
Qty: 90 TABLET | Refills: 3 | OUTPATIENT
Start: 2022-02-15

## 2022-02-15 NOTE — TELEPHONE ENCOUNTER
Just spoke with the patient. The patient states she has another PCP at another clinic and does not want to schedule an appt at [facility]. The patient was informed an appt was needed in order to rec'v requested medications, the patient asked to cancel the medication requests.

## 2022-02-15 NOTE — TELEPHONE ENCOUNTER
Attempted to contact patient at phone number on MAYURI found in media tab. Left voice mail requesting call back to clinic at main number. If patient returns call, please schedule for an appointment with any provider (if they would like to maintain care here).    Called [phone] and received message \"the person you are trying to call is not accepting phone calls at this time, please try again later. \"  Called [phone] and received same message.       Jade Alexandra, HEBERT  01/24/22 1207

## 2022-02-15 NOTE — TELEPHONE ENCOUNTER
Medication Refill Denied  Reason: Patient needs: provider visit  Provider: I have not called the patient about the Rx denial, please call.  PCS: Please notify the pharmacy, Please contact the patient to explain reasoning provided above and to schedule the patient for a provider visit with any provider..    Once patient makes an appointment please send back to me to    order temporary refill so that the patient will not run out of medication prior to the scheduled visit.    Amna Gonzalez, DO

## 2022-06-23 DIAGNOSIS — E03.8 OTHER SPECIFIED HYPOTHYROIDISM: ICD-10-CM

## 2022-06-23 NOTE — TELEPHONE ENCOUNTER
"Request for medication refill:    Pre-fernanda 27-0.8 MG tablets    Providers if patient needs an appointment and you are willing to give a one month supply please refill for one month and  send a letter/MyChart using \".SMILLIMITEDREFILL\" .smillimited and route chart to \"[facility] \" (Giving one month refill in non controlled medications is strongly recommended before denial)    If refill has been denied, meaning absolutely no refills without visit, please complete the smart phrase \".smirxrefuse\" and route it to the \"[facility] MED REFILLS\"  pool to inform the patient and the pharmacy.    Cindy Vincent MA      "

## 2022-06-24 RX ORDER — PNV NO.95/FERROUS FUM/FOLIC AC 28MG-0.8MG
1 TABLET ORAL DAILY
Qty: 90 TABLET | Refills: 3 | Status: SHIPPED | OUTPATIENT
Start: 2022-06-24 | End: 2023-07-05

## 2023-07-05 DIAGNOSIS — E03.8 OTHER SPECIFIED HYPOTHYROIDISM: ICD-10-CM

## 2023-07-05 RX ORDER — PNV NO.95/FERROUS FUM/FOLIC AC 28MG-0.8MG
1 TABLET ORAL DAILY
Qty: 90 TABLET | Refills: 3 | Status: SHIPPED | OUTPATIENT
Start: 2023-07-05

## 2023-07-05 NOTE — TELEPHONE ENCOUNTER
"Request for medication refill:  Prenatal Vit-Fe Fumarate-FA (PRENATAL VITAMIN) 27-0.8 MG TABS  Providers if patient needs an appointment and you are willing to give a one month supply please refill for one month and  send a letter/MyChart using \".SMILLIMITEDREFILL\" .smillimited and route chart to \"[facility] \" (Giving one month refill in non controlled medications is strongly recommended before denial)    If refill has been denied, meaning absolutely no refills without visit, please complete the smart phrase \".smirxrefuse\" and route it to the \"[facility] MED REFILL[facility]\"  pool to inform the patient and the pharmacy.    Cachorro Reyes MA        "

## 2024-08-14 DIAGNOSIS — R74.01 NONSPECIFIC ELEVATION OF LEVELS OF TRANSAMINASE OR LACTIC ACID DEHYDROGENASE (LDH): ICD-10-CM

## 2024-08-14 DIAGNOSIS — R74.02 NONSPECIFIC ELEVATION OF LEVELS OF TRANSAMINASE OR LACTIC ACID DEHYDROGENASE (LDH): ICD-10-CM

## 2024-08-14 DIAGNOSIS — E66.9 OBESITY, UNSPECIFIED: Primary | ICD-10-CM

## 2024-11-18 ENCOUNTER — LAB (OUTPATIENT)
Dept: LAB | Facility: CLINIC | Age: 39
End: 2024-11-18
Payer: COMMERCIAL

## 2024-11-18 DIAGNOSIS — E66.9 OBESITY, UNSPECIFIED: ICD-10-CM

## 2024-11-18 DIAGNOSIS — R74.01 NONSPECIFIC ELEVATION OF LEVELS OF TRANSAMINASE OR LACTIC ACID DEHYDROGENASE (LDH): ICD-10-CM

## 2024-11-18 DIAGNOSIS — R74.02 NONSPECIFIC ELEVATION OF LEVELS OF TRANSAMINASE OR LACTIC ACID DEHYDROGENASE (LDH): ICD-10-CM

## 2024-11-18 LAB
ALBUMIN SERPL BCG-MCNC: 4.3 G/DL (ref 3.5–5.2)
ALP SERPL-CCNC: 96 U/L (ref 40–150)
ALT SERPL W P-5'-P-CCNC: 17 U/L (ref 0–50)
ANION GAP SERPL CALCULATED.3IONS-SCNC: 12 MMOL/L (ref 7–15)
AST SERPL W P-5'-P-CCNC: 21 U/L (ref 0–45)
BILIRUB SERPL-MCNC: 0.3 MG/DL
BUN SERPL-MCNC: 25.5 MG/DL (ref 6–20)
CALCIUM SERPL-MCNC: 9.7 MG/DL (ref 8.8–10.4)
CHLORIDE SERPL-SCNC: 105 MMOL/L (ref 98–107)
CREAT SERPL-MCNC: 1 MG/DL (ref 0.51–0.95)
EGFRCR SERPLBLD CKD-EPI 2021: 74 ML/MIN/1.73M2
EST. AVERAGE GLUCOSE BLD GHB EST-MCNC: 94 MG/DL
FERRITIN SERPL-MCNC: 183 NG/ML (ref 6–175)
GLUCOSE SERPL-MCNC: 104 MG/DL (ref 70–99)
HBA1C MFR BLD: 4.9 %
HBV CORE AB SERPL QL IA: NONREACTIVE
HBV SURFACE AB SERPL IA-ACNC: <3.5 M[IU]/ML
HBV SURFACE AB SERPL IA-ACNC: NONREACTIVE M[IU]/ML
HBV SURFACE AG SERPL QL IA: NONREACTIVE
HCO3 SERPL-SCNC: 21 MMOL/L (ref 22–29)
IRON BINDING CAPACITY (ROCHE): 250 UG/DL (ref 240–430)
IRON SATN MFR SERPL: 36 % (ref 15–46)
IRON SERPL-MCNC: 89 UG/DL (ref 37–145)
POTASSIUM SERPL-SCNC: 3.5 MMOL/L (ref 3.4–5.3)
PROT SERPL-MCNC: 7.3 G/DL (ref 6.4–8.3)
SODIUM SERPL-SCNC: 138 MMOL/L (ref 135–145)
TSH SERPL DL<=0.005 MIU/L-ACNC: 5.47 UIU/ML (ref 0.3–4.2)

## 2024-11-18 PROCEDURE — 86704 HEP B CORE ANTIBODY TOTAL: CPT

## 2024-11-18 PROCEDURE — 83540 ASSAY OF IRON: CPT

## 2024-11-18 PROCEDURE — 83036 HEMOGLOBIN GLYCOSYLATED A1C: CPT

## 2024-11-18 PROCEDURE — 36415 COLL VENOUS BLD VENIPUNCTURE: CPT

## 2024-11-18 PROCEDURE — 87340 HEPATITIS B SURFACE AG IA: CPT

## 2024-11-18 PROCEDURE — 87522 HEPATITIS C REVRS TRNSCRPJ: CPT

## 2024-11-18 PROCEDURE — 84443 ASSAY THYROID STIM HORMONE: CPT

## 2024-11-18 PROCEDURE — 82728 ASSAY OF FERRITIN: CPT

## 2024-11-18 PROCEDURE — 86706 HEP B SURFACE ANTIBODY: CPT

## 2024-11-18 PROCEDURE — 83550 IRON BINDING TEST: CPT

## 2024-11-18 PROCEDURE — 80053 COMPREHEN METABOLIC PANEL: CPT

## 2024-11-19 LAB — HCV RNA SERPL NAA+PROBE-ACNC: NOT DETECTED IU/ML

## 2025-01-28 DIAGNOSIS — E03.9 MYXEDEMA HEART DISEASE: Primary | ICD-10-CM

## 2025-01-28 DIAGNOSIS — I51.9 MYXEDEMA HEART DISEASE: Primary | ICD-10-CM
